# Patient Record
Sex: MALE | Race: WHITE | NOT HISPANIC OR LATINO | Employment: FULL TIME | URBAN - METROPOLITAN AREA
[De-identification: names, ages, dates, MRNs, and addresses within clinical notes are randomized per-mention and may not be internally consistent; named-entity substitution may affect disease eponyms.]

---

## 2020-10-14 PROCEDURE — 90686 IIV4 VACC NO PRSV 0.5 ML IM: CPT | Performed by: NURSE PRACTITIONER

## 2020-10-14 PROCEDURE — 90471 IMMUNIZATION ADMIN: CPT | Performed by: NURSE PRACTITIONER

## 2020-10-15 ENCOUNTER — DOCUMENTATION (OUTPATIENT)
Dept: BARIATRICS/WEIGHT MGMT | Facility: CLINIC | Age: 45
End: 2020-10-15

## 2020-10-15 ENCOUNTER — OFFICE VISIT (OUTPATIENT)
Dept: BARIATRICS/WEIGHT MGMT | Facility: CLINIC | Age: 45
End: 2020-10-15

## 2020-10-15 VITALS
SYSTOLIC BLOOD PRESSURE: 132 MMHG | HEIGHT: 70 IN | DIASTOLIC BLOOD PRESSURE: 78 MMHG | WEIGHT: 315 LBS | TEMPERATURE: 97.7 F | RESPIRATION RATE: 18 BRPM | HEART RATE: 94 BPM | OXYGEN SATURATION: 100 % | BODY MASS INDEX: 45.1 KG/M2

## 2020-10-15 DIAGNOSIS — R53.83 FATIGUE, UNSPECIFIED TYPE: ICD-10-CM

## 2020-10-15 DIAGNOSIS — E66.01 OBESITY, CLASS III, BMI 40-49.9 (MORBID OBESITY) (HCC): Primary | ICD-10-CM

## 2020-10-15 DIAGNOSIS — Z82.49 FHX: CARDIOVASCULAR DISEASE: ICD-10-CM

## 2020-10-15 DIAGNOSIS — K21.9 GASTROESOPHAGEAL REFLUX DISEASE, UNSPECIFIED WHETHER ESOPHAGITIS PRESENT: ICD-10-CM

## 2020-10-15 DIAGNOSIS — R10.13 DYSPEPSIA: ICD-10-CM

## 2020-10-15 DIAGNOSIS — Z72.0 TOBACCO USE: ICD-10-CM

## 2020-10-15 PROCEDURE — 99204 OFFICE O/P NEW MOD 45 MIN: CPT | Performed by: PHYSICIAN ASSISTANT

## 2020-10-15 RX ORDER — PHENTERMINE HYDROCHLORIDE 37.5 MG/1
37.5 TABLET ORAL DAILY
COMMUNITY
Start: 2020-07-29 | End: 2020-12-22

## 2020-10-15 RX ORDER — PHENTERMINE HYDROCHLORIDE 30 MG/1
37.5 CAPSULE ORAL EVERY MORNING
COMMUNITY
End: 2020-10-15

## 2020-10-15 RX ORDER — PANTOPRAZOLE SODIUM 40 MG/1
40 TABLET, DELAYED RELEASE ORAL
COMMUNITY

## 2020-10-15 NOTE — PROGRESS NOTES
[facility] BARIATRIC SURGERY  [address]  [address]  [facility] [address]  [phone]      Patient  Name:  Jefry Sierra  :  1975      Date of Visit: 10/15/2020      Chief Complaint:  weight gain; unable to maintain weight loss      History of Present Illness:  Jefry Sierra is a 45 y.o. male who presents today for evaluation, education and consultation regarding metabolic and bariatric surgery with Dr. Glasgow.     Jefry has been overweight for at least 35 years, has been 35 pounds or more overweight for at least 30 years, has been 100 pounds or more overweight for 25 or more years and started dieting at age 20.  Previous diet attempts include: Low Carbohydrate, Low Fat and Slim Fast; Adipex.  The most weight Jefry lost was 80 pounds but was unable to maintain that weight loss.  His maximum lifetime weight is 348 pounds.       Complete history has been obtained and discussed today, as pertinent to metabolic/ bariatric surgery.     Past Medical History:   Diagnosis Date   • Anesthesia complication     wakes w/ terrible headache   • Dyspepsia    • Dyspnea on exertion    • Fatigue    • GERD (gastroesophageal reflux disease)     controlled on Protonix, improved since stopping tobacco, denies prior eval   • IBS (irritable bowel syndrome)     worse w/ dairy or chocolates, denies prior GB eval    • Joint pain     prn Ibuprofen, avoids steroids   • Morbid obesity (CMS/HCC)    • RLS (restless legs syndrome)    • Sleep apnea     suspected, never tested   • Tobacco use     SNUFF, quit 22     Past Surgical History:   Procedure Laterality Date   • WISDOM TOOTH EXTRACTION     • WRIST SURGERY Right     fx repair       Allergies   Allergen Reactions   • Penicillins Rash     reaction as a child - uncertain if can tolerate Keflex       Current Outpatient Medications:   •  pantoprazole (PROTONIX) 40 MG EC tablet, Take 40 mg by mouth Daily., Disp: , Rfl:   •  phentermine (ADIPEX-P) 37.5 MG  tablet, Take 37.5 mg by mouth Daily., Disp: , Rfl:     Social History     Socioeconomic History   • Marital status:      Spouse name: Not on file   • Number of children: Not on file   • Years of education: Not on file   • Highest education level: Not on file   Tobacco Use   • Smoking status: Never Smoker   • Smokeless tobacco: Former User     Types: Snuff   Substance and Sexual Activity   • Alcohol use: Never     Frequency: Never   • Drug use: Never   Social History Narrative    .  Lives in [place].   w/ [employer].      Family History   Problem Relation Age of Onset   • Hypertension Mother    • Heart disease Mother    • Sleep apnea Mother    • Obesity Brother        Review of Systems:  Constitutional:  reports fatigue, weight gain and denies fevers, chills.  HEENT:  denies headache, ear pain or loss of hearing, blurred or double vision, nasal discharge or sore throat.  Cardiovascular:   denies hx heart disease, hx MI, chest pain, palpitations, edema, hx DVT.  Respiratory:   denies cough , wheezing, asthma, hx PE.  Gastrointestinal:  reports heartburn, IBS and denies dysphagia, nausea, vomiting, abdominal pain, gallbladder issues, liver disease.  Genitourinary:   denies history of  frequent UTI, incontinence, hematuria, dysuria, polyuria, polydipsia, renal insufficiency.    Musculoskeletal:  reports joint pain, back pain and denies fibromyalgia, arthritis and autoimmune disease.  Neurological:   denies migraines, numbness /tingling, dizziness, confusion, seizure.  Psychiatric:   denies depressed mood, hx depression, feeling anxious, hx anxiety, bipolar disorder.  Endocrine:   denies glucose intolerance, diabetes, thyroid disease, gout.  Hematologic:  denies bruising, bleeding disorder, hx anemia, hx blood transfusion.  Skin:   denies rashes, hx MRSA.    Physical Exam:  Vital Signs:  Weight: (!) 147 kg (324 lb 8 oz)   Body mass index is 46.56 kg/m².  Temp: 97.7 °F (36.5 °C)   Heart Rate: 94    BP: 132/78     Physical Exam  Vitals signs reviewed.   Constitutional:       Appearance: He is well-developed.      Comments: wearing a mask   HENT:      Head: Normocephalic and atraumatic.   Eyes:      General: No scleral icterus.     Conjunctiva/sclera: Conjunctivae normal.   Neck:      Musculoskeletal: Neck supple.      Thyroid: No thyromegaly.   Cardiovascular:      Rate and Rhythm: Normal rate and regular rhythm.      Heart sounds: No murmur.   Pulmonary:      Effort: Pulmonary effort is normal. No respiratory distress.      Breath sounds: Normal breath sounds. No wheezing or rales.   Abdominal:      General: Bowel sounds are normal. There is no distension.      Palpations: Abdomen is soft. There is no mass.      Tenderness: There is no abdominal tenderness.      Hernia: No hernia is present.      Comments: no surgical scars   Musculoskeletal: Normal range of motion.   Skin:     General: Skin is warm and dry.      Findings: No rash.   Neurological:      Mental Status: He is alert and oriented to person, place, and time.      Gait: Gait normal.   Psychiatric:         Judgment: Judgment normal.         Patient Active Problem List   Diagnosis   • Fatigue   • Dyspepsia   • Dyspnea on exertion   • Morbid obesity (CMS/HCC)   • Sleep apnea   • GERD (gastroesophageal reflux disease)   • IBS (irritable bowel syndrome)   • Joint pain   • RLS (restless legs syndrome)   • Tobacco use   • Anesthesia complication       Assessment:  45 y.o. male with medically complicated obesity pursuing sleeve gastrectomy.    Patient's Body mass index is 46.56 kg/m². BMI is above normal parameters. Recommendations include: MBS.  Metabolic & Bariatric Surgery is deemed medically necessary given the following obesity related comorbidities: possible sleep apnea and GERD .    Plan:  Patient understands that bariatric surgery is not cosmetic surgery but rather a tool to help make a lifelong commitment to lifestyle changes including diet,  exercise, behavior modifications, and healthy habits.  The patient has been educated today on those expected postoperative lifestyle changes.  Psychological and Nutritional consultations will be arranged prior to surgery.  Instructions on how to access [employer] (an internet based site w/ educational surgical videos) were given to the patient.  Recommended vitamin supplementation was reviewed.  The importance of avoiding ASA/ NSAIDS/ steroids/ tobacco/ hormones/ immunomodulators perioperatively was discussed in detail.  All questions/concerns have been addressed.      Further evaluation will include: CBC, CMP, Lipids, TSH, HgA1C, H.Pylori Stool, EKG, CXR, EGD and Nicotine Testing.    Additional clearances needed prior to surgery will include: Cardiology.       Further input to follow pending the above.           LOY Marsh

## 2020-10-15 NOTE — PROGRESS NOTES
"Metabolic and Bariatric Surgery  Presurgical Nutrition Assessment     Jefry Sierra  10/15/2020  [id number]  [id number]  1975  male    Surgery desired: Sleeve Gastrectomy     Ht 177.8 cm (70\")   Wt 147 kg (324 lb 8 oz)   Past Medical History:   Diagnosis Date   • Anesthesia complication     wakes w/ terrible headache   • Dyspepsia    • Dyspnea on exertion    • Fatigue    • GERD (gastroesophageal reflux disease)     controlled on Protonix, improved since stopping tobacco, denies prior eval   • IBS (irritable bowel syndrome)     worse w/ dairy or chocolates, denies prior GB eval    • Joint pain     prn Ibuprofen, avoids steroids   • Morbid obesity (CMS/HCC)    • RLS (restless legs syndrome)    • Sleep apnea     suspected, never tested   • Tobacco use     SNUFF, quit 9/4/20     Past Surgical History:   Procedure Laterality Date   • WISDOM TOOTH EXTRACTION  2000   • WRIST SURGERY Right 2000    fx repair     Allergies   Allergen Reactions   • Penicillins Rash     reaction as a child - uncertain if can tolerate Keflex       Current Outpatient Medications:   •  pantoprazole (PROTONIX) 40 MG EC tablet, Take 40 mg by mouth Daily., Disp: , Rfl:   •  phentermine (ADIPEX-P) 37.5 MG tablet, Take 37.5 mg by mouth Daily., Disp: , Rfl:       Nutrition Assessment    Estimated energy needs: 2600    Estimated calories for weight loss: 1800    IBW (Pounds): 224        Excess body weight (Pounds): 100       Nutrition Recall  24 Hour recall: (B) (L) (D) -  Reviewed and discussed with patient  coffee  1pm:  [employer] sandwich and curly fries, coke  6pm:  16 ounce ribeye, baked potato, water  8pm:  Brownie  Water       Exercise  rarely      Education    Provided manual for Sleeve Gastrectomy and reviewed necessary vitamin and protein supplementation for surgery.     Provided follow-up options for support, including contact information for dietitians here, if desired.      Recommend that team follow per protocol.      Nutrition Goals "   Dietary Guidelines per manual  Protein goal:  grams per day   Eliminate soda    Exercise Goals  Add 15-30 minutes of activity per day as tolerated        Kaur Davis, SAMEER  10/15/2020  14:05 EDT

## 2020-10-16 LAB
ALBUMIN SERPL-MCNC: 4.4 G/DL (ref 4–5)
ALBUMIN/GLOB SERPL: 1.5 {RATIO} (ref 1.2–2.2)
ALP SERPL-CCNC: 78 IU/L (ref 39–117)
ALT SERPL-CCNC: 26 IU/L (ref 0–44)
AST SERPL-CCNC: 23 IU/L (ref 0–40)
BASOPHILS # BLD AUTO: 0.1 X10E3/UL (ref 0–0.2)
BASOPHILS NFR BLD AUTO: 1 %
BILIRUB SERPL-MCNC: 0.4 MG/DL (ref 0–1.2)
BUN SERPL-MCNC: 14 MG/DL (ref 6–24)
BUN/CREAT SERPL: 16 (ref 9–20)
CALCIUM SERPL-MCNC: 9.3 MG/DL (ref 8.7–10.2)
CHLORIDE SERPL-SCNC: 104 MMOL/L (ref 96–106)
CHOLEST SERPL-MCNC: 178 MG/DL (ref 100–199)
CO2 SERPL-SCNC: 24 MMOL/L (ref 20–29)
CREAT SERPL-MCNC: 0.87 MG/DL (ref 0.76–1.27)
EOSINOPHIL # BLD AUTO: 0.2 X10E3/UL (ref 0–0.4)
EOSINOPHIL NFR BLD AUTO: 2 %
ERYTHROCYTE [DISTWIDTH] IN BLOOD BY AUTOMATED COUNT: 13.2 % (ref 11.6–15.4)
GLOBULIN SER CALC-MCNC: 2.9 G/DL (ref 1.5–4.5)
GLUCOSE SERPL-MCNC: 92 MG/DL (ref 65–99)
HBA1C MFR BLD: 5.7 % (ref 4.8–5.6)
HCT VFR BLD AUTO: 43.2 % (ref 37.5–51)
HDLC SERPL-MCNC: 33 MG/DL
HGB BLD-MCNC: 14.4 G/DL (ref 13–17.7)
IMM GRANULOCYTES # BLD AUTO: 0 X10E3/UL (ref 0–0.1)
IMM GRANULOCYTES NFR BLD AUTO: 0 %
LDLC SERPL CALC-MCNC: 119 MG/DL (ref 0–99)
LYMPHOCYTES # BLD AUTO: 2.4 X10E3/UL (ref 0.7–3.1)
LYMPHOCYTES NFR BLD AUTO: 24 %
MCH RBC QN AUTO: 27.7 PG (ref 26.6–33)
MCHC RBC AUTO-ENTMCNC: 33.3 G/DL (ref 31.5–35.7)
MCV RBC AUTO: 83 FL (ref 79–97)
MONOCYTES # BLD AUTO: 0.7 X10E3/UL (ref 0.1–0.9)
MONOCYTES NFR BLD AUTO: 7 %
NEUTROPHILS # BLD AUTO: 6.4 X10E3/UL (ref 1.4–7)
NEUTROPHILS NFR BLD AUTO: 66 %
PLATELET # BLD AUTO: 330 X10E3/UL (ref 150–450)
POTASSIUM SERPL-SCNC: 4.5 MMOL/L (ref 3.5–5.2)
PROT SERPL-MCNC: 7.3 G/DL (ref 6–8.5)
RBC # BLD AUTO: 5.2 X10E6/UL (ref 4.14–5.8)
SODIUM SERPL-SCNC: 141 MMOL/L (ref 134–144)
TRIGL SERPL-MCNC: 146 MG/DL (ref 0–149)
TSH SERPL DL<=0.005 MIU/L-ACNC: 3.02 UIU/ML (ref 0.45–4.5)
VLDLC SERPL CALC-MCNC: 26 MG/DL (ref 5–40)
WBC # BLD AUTO: 9.8 X10E3/UL (ref 3.4–10.8)

## 2020-10-23 ENCOUNTER — APPOINTMENT (OUTPATIENT)
Dept: PREADMISSION TESTING | Facility: HOSPITAL | Age: 45
End: 2020-10-23

## 2020-10-23 PROCEDURE — C9803 HOPD COVID-19 SPEC COLLECT: HCPCS

## 2020-10-23 PROCEDURE — U0004 COV-19 TEST NON-CDC HGH THRU: HCPCS

## 2020-10-24 LAB — SARS-COV-2 RNA RESP QL NAA+PROBE: NOT DETECTED

## 2020-10-26 ENCOUNTER — OUTSIDE FACILITY SERVICE (OUTPATIENT)
Dept: BARIATRICS/WEIGHT MGMT | Facility: CLINIC | Age: 45
End: 2020-10-26

## 2020-10-26 ENCOUNTER — LAB REQUISITION (OUTPATIENT)
Dept: LAB | Facility: HOSPITAL | Age: 45
End: 2020-10-26

## 2020-10-26 DIAGNOSIS — K21.00 GASTRO-ESOPHAGEAL REFLUX DISEASE WITH ESOPHAGITIS, WITHOUT BLEEDING: ICD-10-CM

## 2020-10-26 PROCEDURE — 43239 EGD BIOPSY SINGLE/MULTIPLE: CPT | Performed by: SURGERY

## 2020-10-26 PROCEDURE — 88305 TISSUE EXAM BY PATHOLOGIST: CPT | Performed by: SURGERY

## 2020-10-26 PROCEDURE — 88342 IMHCHEM/IMCYTCHM 1ST ANTB: CPT | Performed by: SURGERY

## 2020-10-30 ENCOUNTER — LAB (OUTPATIENT)
Dept: LAB | Facility: HOSPITAL | Age: 45
End: 2020-10-30

## 2020-10-30 DIAGNOSIS — R10.13 DYSPEPSIA: ICD-10-CM

## 2020-10-30 PROCEDURE — 83013 H PYLORI (C-13) BREATH: CPT

## 2020-11-01 LAB
CYTO UR: NORMAL
LAB AP CASE REPORT: NORMAL
LAB AP CLINICAL INFORMATION: NORMAL
PATH REPORT.FINAL DX SPEC: NORMAL
PATH REPORT.GROSS SPEC: NORMAL

## 2020-11-02 LAB — UREA BREATH TEST QL: POSITIVE

## 2020-12-01 RX ORDER — CLARITHROMYCIN 500 MG/1
500 TABLET, COATED ORAL 2 TIMES DAILY
Qty: 28 TABLET | Refills: 0 | Status: CANCELLED | OUTPATIENT
Start: 2020-12-01 | End: 2020-12-15

## 2020-12-01 RX ORDER — METRONIDAZOLE 500 MG/1
500 TABLET ORAL 3 TIMES DAILY
Qty: 42 TABLET | Refills: 0 | Status: CANCELLED | OUTPATIENT
Start: 2020-12-01 | End: 2020-12-15

## 2020-12-01 RX ORDER — OMEPRAZOLE 20 MG/1
20 CAPSULE, DELAYED RELEASE ORAL 2 TIMES DAILY
Qty: 28 CAPSULE | Refills: 0 | Status: CANCELLED | OUTPATIENT
Start: 2020-12-01 | End: 2020-12-15

## 2020-12-02 DIAGNOSIS — Z87.891 HX OF TOBACCO USE, PRESENTING HAZARDS TO HEALTH: Primary | ICD-10-CM

## 2020-12-04 ENCOUNTER — LAB (OUTPATIENT)
Dept: LAB | Facility: HOSPITAL | Age: 45
End: 2020-12-04

## 2020-12-04 DIAGNOSIS — Z87.891 HX OF TOBACCO USE, PRESENTING HAZARDS TO HEALTH: ICD-10-CM

## 2020-12-04 PROCEDURE — G0480 DRUG TEST DEF 1-7 CLASSES: HCPCS

## 2020-12-04 PROCEDURE — 36415 COLL VENOUS BLD VENIPUNCTURE: CPT

## 2020-12-11 LAB
COTININE SERPL-MCNC: <1 NG/ML
NICOTINE SERPL-MCNC: <1 NG/ML

## 2020-12-15 ENCOUNTER — PREP FOR SURGERY (OUTPATIENT)
Dept: OTHER | Facility: HOSPITAL | Age: 45
End: 2020-12-15

## 2020-12-15 DIAGNOSIS — E66.01 MORBID OBESITY WITH BODY MASS INDEX (BMI) OF 45.0 TO 49.9 IN ADULT (HCC): Primary | ICD-10-CM

## 2020-12-15 DIAGNOSIS — K21.9 HIATAL HERNIA WITH GASTROESOPHAGEAL REFLUX: ICD-10-CM

## 2020-12-15 DIAGNOSIS — K44.9 HIATAL HERNIA WITH GASTROESOPHAGEAL REFLUX: ICD-10-CM

## 2020-12-15 DIAGNOSIS — R06.00 DYSPNEA, UNSPECIFIED TYPE: Primary | ICD-10-CM

## 2020-12-15 DIAGNOSIS — R53.83 FATIGUE, UNSPECIFIED TYPE: ICD-10-CM

## 2020-12-15 RX ORDER — GABAPENTIN 300 MG/1
600 CAPSULE ORAL ONCE
Status: CANCELLED | OUTPATIENT
Start: 2020-12-15 | End: 2020-12-15

## 2020-12-15 RX ORDER — PANTOPRAZOLE SODIUM 40 MG/10ML
40 INJECTION, POWDER, LYOPHILIZED, FOR SOLUTION INTRAVENOUS ONCE
Status: CANCELLED | OUTPATIENT
Start: 2020-12-15 | End: 2020-12-15

## 2020-12-15 RX ORDER — CHLORHEXIDINE GLUCONATE 0.12 MG/ML
30 RINSE ORAL
Status: CANCELLED | OUTPATIENT
Start: 2020-12-15 | End: 2020-12-15

## 2020-12-15 RX ORDER — SODIUM CHLORIDE, SODIUM LACTATE, POTASSIUM CHLORIDE, CALCIUM CHLORIDE 600; 310; 30; 20 MG/100ML; MG/100ML; MG/100ML; MG/100ML
150 INJECTION, SOLUTION INTRAVENOUS CONTINUOUS
Status: CANCELLED | OUTPATIENT
Start: 2020-12-15

## 2020-12-15 RX ORDER — LEVOFLOXACIN 5 MG/ML
500 INJECTION, SOLUTION INTRAVENOUS EVERY 24 HOURS
Status: CANCELLED | OUTPATIENT
Start: 2020-12-15 | End: 2020-12-16

## 2020-12-15 RX ORDER — SCOLOPAMINE TRANSDERMAL SYSTEM 1 MG/1
1 PATCH, EXTENDED RELEASE TRANSDERMAL ONCE
Status: CANCELLED | OUTPATIENT
Start: 2020-12-15 | End: 2020-12-15

## 2020-12-15 RX ORDER — SODIUM CHLORIDE 0.9 % (FLUSH) 0.9 %
3-10 SYRINGE (ML) INJECTION AS NEEDED
Status: CANCELLED | OUTPATIENT
Start: 2020-12-15

## 2020-12-15 RX ORDER — SODIUM CHLORIDE 0.9 % (FLUSH) 0.9 %
3 SYRINGE (ML) INJECTION EVERY 12 HOURS SCHEDULED
Status: CANCELLED | OUTPATIENT
Start: 2020-12-15

## 2020-12-15 RX ORDER — ACETAMINOPHEN 500 MG
1000 TABLET ORAL ONCE
Status: CANCELLED | OUTPATIENT
Start: 2020-12-15 | End: 2020-12-15

## 2020-12-16 ENCOUNTER — LAB (OUTPATIENT)
Dept: LAB | Facility: HOSPITAL | Age: 45
End: 2020-12-16

## 2020-12-16 ENCOUNTER — HOSPITAL ENCOUNTER (OUTPATIENT)
Dept: GENERAL RADIOLOGY | Facility: HOSPITAL | Age: 45
Discharge: HOME | End: 2020-12-16

## 2020-12-16 DIAGNOSIS — R53.83 FATIGUE, UNSPECIFIED TYPE: ICD-10-CM

## 2020-12-16 DIAGNOSIS — R06.00 DYSPNEA, UNSPECIFIED TYPE: ICD-10-CM

## 2020-12-16 LAB
ALBUMIN SERPL-MCNC: 4.3 G/DL (ref 3.5–5.2)
ALBUMIN/GLOB SERPL: 1.3 G/DL
ALP SERPL-CCNC: 53 U/L (ref 39–117)
ALT SERPL W P-5'-P-CCNC: 37 U/L (ref 1–41)
ANION GAP SERPL CALCULATED.3IONS-SCNC: 10.6 MMOL/L (ref 5–15)
AST SERPL-CCNC: 28 U/L (ref 1–40)
BILIRUB SERPL-MCNC: 0.4 MG/DL (ref 0–1.2)
BUN SERPL-MCNC: 19 MG/DL (ref 6–20)
BUN/CREAT SERPL: 25 (ref 7–25)
CALCIUM SPEC-SCNC: 9.4 MG/DL (ref 8.6–10.5)
CHLORIDE SERPL-SCNC: 102 MMOL/L (ref 98–107)
CO2 SERPL-SCNC: 24.4 MMOL/L (ref 22–29)
CREAT SERPL-MCNC: 0.76 MG/DL (ref 0.76–1.27)
DEPRECATED RDW RBC AUTO: 38 FL (ref 37–54)
ERYTHROCYTE [DISTWIDTH] IN BLOOD BY AUTOMATED COUNT: 13.6 % (ref 12.3–15.4)
GFR SERPL CREATININE-BSD FRML MDRD: 112 ML/MIN/1.73
GLOBULIN UR ELPH-MCNC: 3.4 GM/DL
GLUCOSE SERPL-MCNC: 94 MG/DL (ref 65–99)
HCT VFR BLD AUTO: 42.1 % (ref 37.5–51)
HGB BLD-MCNC: 14.3 G/DL (ref 13–17.7)
MCH RBC QN AUTO: 27.1 PG (ref 26.6–33)
MCHC RBC AUTO-ENTMCNC: 34 G/DL (ref 31.5–35.7)
MCV RBC AUTO: 79.9 FL (ref 79–97)
PLATELET # BLD AUTO: 337 10*3/MM3 (ref 140–450)
PMV BLD AUTO: 10.7 FL (ref 6–12)
POTASSIUM SERPL-SCNC: 4 MMOL/L (ref 3.5–5.2)
PROT SERPL-MCNC: 7.7 G/DL (ref 6–8.5)
RBC # BLD AUTO: 5.27 10*6/MM3 (ref 4.14–5.8)
SODIUM SERPL-SCNC: 137 MMOL/L (ref 136–145)
WBC # BLD AUTO: 7.39 10*3/MM3 (ref 3.4–10.8)

## 2020-12-16 PROCEDURE — 71046 X-RAY EXAM CHEST 2 VIEWS: CPT

## 2020-12-16 PROCEDURE — 85027 COMPLETE CBC AUTOMATED: CPT

## 2020-12-16 PROCEDURE — 80053 COMPREHEN METABOLIC PANEL: CPT

## 2020-12-16 PROCEDURE — 71046 X-RAY EXAM CHEST 2 VIEWS: CPT | Performed by: RADIOLOGY

## 2020-12-16 PROCEDURE — 36415 COLL VENOUS BLD VENIPUNCTURE: CPT

## 2020-12-17 PROBLEM — K44.9 HIATAL HERNIA WITH GASTROESOPHAGEAL REFLUX: Status: ACTIVE | Noted: 2020-12-17

## 2020-12-17 PROBLEM — K21.9 HIATAL HERNIA WITH GASTROESOPHAGEAL REFLUX: Status: ACTIVE | Noted: 2020-12-17

## 2020-12-22 ENCOUNTER — CONSULT (OUTPATIENT)
Dept: BARIATRICS/WEIGHT MGMT | Facility: CLINIC | Age: 45
End: 2020-12-22

## 2020-12-22 VITALS
OXYGEN SATURATION: 98 % | HEART RATE: 102 BPM | SYSTOLIC BLOOD PRESSURE: 134 MMHG | DIASTOLIC BLOOD PRESSURE: 76 MMHG | RESPIRATION RATE: 18 BRPM | HEIGHT: 70 IN | WEIGHT: 315 LBS | TEMPERATURE: 98.2 F | BODY MASS INDEX: 45.1 KG/M2

## 2020-12-22 DIAGNOSIS — K21.9 GASTROESOPHAGEAL REFLUX DISEASE WITHOUT ESOPHAGITIS: ICD-10-CM

## 2020-12-22 DIAGNOSIS — A04.8 H. PYLORI INFECTION: Primary | ICD-10-CM

## 2020-12-22 DIAGNOSIS — G47.33 OBSTRUCTIVE SLEEP APNEA SYNDROME: ICD-10-CM

## 2020-12-22 DIAGNOSIS — K58.9 IRRITABLE BOWEL SYNDROME, UNSPECIFIED TYPE: ICD-10-CM

## 2020-12-22 PROCEDURE — 99214 OFFICE O/P EST MOD 30 MIN: CPT | Performed by: SURGERY

## 2020-12-22 RX ORDER — LISINOPRIL AND HYDROCHLOROTHIAZIDE 12.5; 1 MG/1; MG/1
1 TABLET ORAL DAILY
COMMUNITY
Start: 2020-12-08 | End: 2020-12-30 | Stop reason: HOSPADM

## 2020-12-27 ENCOUNTER — APPOINTMENT (OUTPATIENT)
Dept: PREADMISSION TESTING | Facility: HOSPITAL | Age: 45
End: 2020-12-27

## 2020-12-27 ENCOUNTER — ANESTHESIA EVENT (OUTPATIENT)
Dept: PERIOP | Facility: HOSPITAL | Age: 45
End: 2020-12-27

## 2020-12-27 DIAGNOSIS — K44.9 HIATAL HERNIA WITH GASTROESOPHAGEAL REFLUX: ICD-10-CM

## 2020-12-27 DIAGNOSIS — E66.01 MORBID OBESITY WITH BODY MASS INDEX (BMI) OF 45.0 TO 49.9 IN ADULT (HCC): ICD-10-CM

## 2020-12-27 DIAGNOSIS — K21.9 HIATAL HERNIA WITH GASTROESOPHAGEAL REFLUX: ICD-10-CM

## 2020-12-27 LAB
ABO GROUP BLD: NORMAL
BLD GP AB SCN SERPL QL: NEGATIVE
DEPRECATED RDW RBC AUTO: 39.8 FL (ref 37–54)
ERYTHROCYTE [DISTWIDTH] IN BLOOD BY AUTOMATED COUNT: 13.6 % (ref 12.3–15.4)
FLUAV RNA RESP QL NAA+PROBE: NOT DETECTED
FLUBV RNA RESP QL NAA+PROBE: NOT DETECTED
HBA1C MFR BLD: 5.7 % (ref 4.8–5.6)
HCT VFR BLD AUTO: 43.7 % (ref 37.5–51)
HGB BLD-MCNC: 14 G/DL (ref 13–17.7)
MCH RBC QN AUTO: 26 PG (ref 26.6–33)
MCHC RBC AUTO-ENTMCNC: 32 G/DL (ref 31.5–35.7)
MCV RBC AUTO: 81.2 FL (ref 79–97)
PLATELET # BLD AUTO: 348 10*3/MM3 (ref 140–450)
PMV BLD AUTO: 10.3 FL (ref 6–12)
POTASSIUM SERPL-SCNC: 4.6 MMOL/L (ref 3.5–5.2)
RBC # BLD AUTO: 5.38 10*6/MM3 (ref 4.14–5.8)
RH BLD: POSITIVE
SARS-COV-2 RNA RESP QL NAA+PROBE: NOT DETECTED
T&S EXPIRATION DATE: NORMAL
WBC # BLD AUTO: 8.27 10*3/MM3 (ref 3.4–10.8)

## 2020-12-27 PROCEDURE — 85027 COMPLETE CBC AUTOMATED: CPT

## 2020-12-27 PROCEDURE — 86900 BLOOD TYPING SEROLOGIC ABO: CPT

## 2020-12-27 PROCEDURE — 83036 HEMOGLOBIN GLYCOSYLATED A1C: CPT

## 2020-12-27 PROCEDURE — 87636 SARSCOV2 & INF A&B AMP PRB: CPT

## 2020-12-27 PROCEDURE — 84132 ASSAY OF SERUM POTASSIUM: CPT

## 2020-12-27 PROCEDURE — 36415 COLL VENOUS BLD VENIPUNCTURE: CPT

## 2020-12-27 PROCEDURE — 86850 RBC ANTIBODY SCREEN: CPT

## 2020-12-27 PROCEDURE — C9803 HOPD COVID-19 SPEC COLLECT: HCPCS

## 2020-12-27 PROCEDURE — 86901 BLOOD TYPING SEROLOGIC RH(D): CPT

## 2020-12-27 RX ORDER — FAMOTIDINE 10 MG/ML
20 INJECTION, SOLUTION INTRAVENOUS ONCE
Status: CANCELLED | OUTPATIENT
Start: 2020-12-27 | End: 2020-12-27

## 2020-12-27 RX ORDER — SODIUM CHLORIDE, SODIUM LACTATE, POTASSIUM CHLORIDE, CALCIUM CHLORIDE 600; 310; 30; 20 MG/100ML; MG/100ML; MG/100ML; MG/100ML
9 INJECTION, SOLUTION INTRAVENOUS CONTINUOUS
Status: CANCELLED | OUTPATIENT
Start: 2020-12-27

## 2020-12-27 RX ORDER — FAMOTIDINE 20 MG/1
20 TABLET, FILM COATED ORAL ONCE
Status: CANCELLED | OUTPATIENT
Start: 2020-12-27 | End: 2020-12-27

## 2020-12-28 ENCOUNTER — ANESTHESIA (OUTPATIENT)
Dept: PERIOP | Facility: HOSPITAL | Age: 45
End: 2020-12-28

## 2020-12-28 ENCOUNTER — HOSPITAL ENCOUNTER (INPATIENT)
Facility: HOSPITAL | Age: 45
LOS: 1 days | Discharge: HOME | End: 2020-12-29
Attending: SURGERY | Admitting: SURGERY

## 2020-12-28 DIAGNOSIS — K21.9 HIATAL HERNIA WITH GASTROESOPHAGEAL REFLUX: ICD-10-CM

## 2020-12-28 DIAGNOSIS — K44.9 HIATAL HERNIA WITH GASTROESOPHAGEAL REFLUX: ICD-10-CM

## 2020-12-28 DIAGNOSIS — E66.01 MORBID OBESITY WITH BODY MASS INDEX (BMI) OF 45.0 TO 49.9 IN ADULT (HCC): ICD-10-CM

## 2020-12-28 LAB
ABO GROUP BLD: NORMAL
RH BLD: POSITIVE

## 2020-12-28 PROCEDURE — 86900 BLOOD TYPING SEROLOGIC ABO: CPT

## 2020-12-28 PROCEDURE — 25010000002 FENTANYL CITRATE (PF) 100 MCG/2ML SOLUTION: Performed by: ANESTHESIOLOGY

## 2020-12-28 PROCEDURE — 88307 TISSUE EXAM BY PATHOLOGIST: CPT | Performed by: SURGERY

## 2020-12-28 PROCEDURE — 86901 BLOOD TYPING SEROLOGIC RH(D): CPT

## 2020-12-28 PROCEDURE — 25010000002 PROPOFOL 10 MG/ML EMULSION: Performed by: ANESTHESIOLOGY

## 2020-12-28 PROCEDURE — 25010000002 LEVOFLOXACIN PER 250 MG: Performed by: SURGERY

## 2020-12-28 PROCEDURE — 25010000002 DEXAMETHASONE SODIUM PHOSPHATE 10 MG/ML SOLUTION: Performed by: ANESTHESIOLOGY

## 2020-12-28 PROCEDURE — 25010000002 ENOXAPARIN PER 10 MG: Performed by: SURGERY

## 2020-12-28 PROCEDURE — 0DB64Z3 EXCISION OF STOMACH, PERCUTANEOUS ENDOSCOPIC APPROACH, VERTICAL: ICD-10-PCS | Performed by: SURGERY

## 2020-12-28 PROCEDURE — 25010000002 ONDANSETRON PER 1 MG: Performed by: SURGERY

## 2020-12-28 PROCEDURE — 25010000002 ONDANSETRON PER 1 MG: Performed by: ANESTHESIOLOGY

## 2020-12-28 PROCEDURE — 0DJ08ZZ INSPECTION OF UPPER INTESTINAL TRACT, VIA NATURAL OR ARTIFICIAL OPENING ENDOSCOPIC: ICD-10-PCS | Performed by: SURGERY

## 2020-12-28 PROCEDURE — 43775 LAP SLEEVE GASTRECTOMY: CPT | Performed by: SURGERY

## 2020-12-28 PROCEDURE — 25010000003 LIDOCAINE 1 % SOLUTION: Performed by: ANESTHESIOLOGY

## 2020-12-28 DEVICE — BLACK REINFORCED INTELLIGENT RELOAD, FOR USE WITH SIGNIA STAPLING SYSTEM
Type: IMPLANTABLE DEVICE | Site: ABDOMEN | Status: FUNCTIONAL
Brand: TRI-STAPLE 2.0

## 2020-12-28 DEVICE — REINFORCED INTELLIGENT RELOAD, FOR USE WITH SIGNIA STAPLING SYSTEM
Type: IMPLANTABLE DEVICE | Site: ABDOMEN | Status: FUNCTIONAL
Brand: TRI-STAPLE 2.0

## 2020-12-28 RX ORDER — ONDANSETRON 4 MG/1
4 TABLET, FILM COATED ORAL EVERY 6 HOURS PRN
Status: DISCONTINUED | OUTPATIENT
Start: 2020-12-29 | End: 2020-12-30 | Stop reason: HOSPADM

## 2020-12-28 RX ORDER — HYDROMORPHONE HYDROCHLORIDE 1 MG/ML
0.5 INJECTION, SOLUTION INTRAMUSCULAR; INTRAVENOUS; SUBCUTANEOUS
Status: DISCONTINUED | OUTPATIENT
Start: 2020-12-28 | End: 2020-12-28 | Stop reason: HOSPADM

## 2020-12-28 RX ORDER — FENTANYL CITRATE 50 UG/ML
50 INJECTION, SOLUTION INTRAMUSCULAR; INTRAVENOUS
Status: DISCONTINUED | OUTPATIENT
Start: 2020-12-28 | End: 2020-12-28 | Stop reason: HOSPADM

## 2020-12-28 RX ORDER — ALBUTEROL SULFATE 2.5 MG/3ML
2.5 SOLUTION RESPIRATORY (INHALATION) EVERY 4 HOURS PRN
Status: DISCONTINUED | OUTPATIENT
Start: 2020-12-28 | End: 2020-12-30 | Stop reason: HOSPADM

## 2020-12-28 RX ORDER — ONDANSETRON 2 MG/ML
4 INJECTION INTRAMUSCULAR; INTRAVENOUS EVERY 6 HOURS
Status: DISPENSED | OUTPATIENT
Start: 2020-12-28 | End: 2020-12-29

## 2020-12-28 RX ORDER — LEVOFLOXACIN 5 MG/ML
500 INJECTION, SOLUTION INTRAVENOUS EVERY 24 HOURS
Status: COMPLETED | OUTPATIENT
Start: 2020-12-29 | End: 2020-12-29

## 2020-12-28 RX ORDER — CHLORHEXIDINE GLUCONATE 0.12 MG/ML
30 RINSE ORAL
Status: COMPLETED | OUTPATIENT
Start: 2020-12-28 | End: 2020-12-28

## 2020-12-28 RX ORDER — ACETAMINOPHEN 500 MG
1000 TABLET ORAL EVERY 8 HOURS SCHEDULED
Status: DISCONTINUED | OUTPATIENT
Start: 2020-12-28 | End: 2020-12-30 | Stop reason: HOSPADM

## 2020-12-28 RX ORDER — LIDOCAINE HYDROCHLORIDE 10 MG/ML
0.5 INJECTION, SOLUTION EPIDURAL; INFILTRATION; INTRACAUDAL; PERINEURAL ONCE AS NEEDED
Status: COMPLETED | OUTPATIENT
Start: 2020-12-28 | End: 2020-12-28

## 2020-12-28 RX ORDER — SODIUM CHLORIDE AND POTASSIUM CHLORIDE 150; 450 MG/100ML; MG/100ML
125 INJECTION, SOLUTION INTRAVENOUS CONTINUOUS
Status: DISCONTINUED | OUTPATIENT
Start: 2020-12-29 | End: 2020-12-30 | Stop reason: HOSPADM

## 2020-12-28 RX ORDER — SCOLOPAMINE TRANSDERMAL SYSTEM 1 MG/1
1 PATCH, EXTENDED RELEASE TRANSDERMAL ONCE
Status: DISCONTINUED | OUTPATIENT
Start: 2020-12-28 | End: 2020-12-28

## 2020-12-28 RX ORDER — PANTOPRAZOLE SODIUM 40 MG/10ML
40 INJECTION, POWDER, LYOPHILIZED, FOR SOLUTION INTRAVENOUS ONCE
Status: COMPLETED | OUTPATIENT
Start: 2020-12-28 | End: 2020-12-28

## 2020-12-28 RX ORDER — DEXAMETHASONE SODIUM PHOSPHATE 10 MG/ML
INJECTION, SOLUTION INTRAMUSCULAR; INTRAVENOUS AS NEEDED
Status: DISCONTINUED | OUTPATIENT
Start: 2020-12-28 | End: 2020-12-28 | Stop reason: SURG

## 2020-12-28 RX ORDER — PROCHLORPERAZINE MALEATE 10 MG
10 TABLET ORAL EVERY 6 HOURS PRN
Status: DISCONTINUED | OUTPATIENT
Start: 2020-12-28 | End: 2020-12-30 | Stop reason: HOSPADM

## 2020-12-28 RX ORDER — HYDROMORPHONE HYDROCHLORIDE 2 MG/1
2 TABLET ORAL EVERY 4 HOURS PRN
Status: DISCONTINUED | OUTPATIENT
Start: 2020-12-28 | End: 2020-12-30 | Stop reason: HOSPADM

## 2020-12-28 RX ORDER — SODIUM CHLORIDE 0.9 % (FLUSH) 0.9 %
10 SYRINGE (ML) INJECTION EVERY 12 HOURS SCHEDULED
Status: DISCONTINUED | OUTPATIENT
Start: 2020-12-28 | End: 2020-12-28 | Stop reason: HOSPADM

## 2020-12-28 RX ORDER — MAGNESIUM HYDROXIDE 1200 MG/15ML
LIQUID ORAL AS NEEDED
Status: DISCONTINUED | OUTPATIENT
Start: 2020-12-28 | End: 2020-12-28 | Stop reason: HOSPADM

## 2020-12-28 RX ORDER — PROMETHAZINE HYDROCHLORIDE 12.5 MG/1
12.5 TABLET ORAL EVERY 6 HOURS PRN
Status: DISCONTINUED | OUTPATIENT
Start: 2020-12-28 | End: 2020-12-30 | Stop reason: HOSPADM

## 2020-12-28 RX ORDER — LORAZEPAM 1 MG/1
1 TABLET ORAL EVERY 12 HOURS PRN
Status: DISCONTINUED | OUTPATIENT
Start: 2020-12-28 | End: 2020-12-30 | Stop reason: HOSPADM

## 2020-12-28 RX ORDER — SIMETHICONE 80 MG
80 TABLET,CHEWABLE ORAL 4 TIMES DAILY PRN
Status: DISCONTINUED | OUTPATIENT
Start: 2020-12-28 | End: 2020-12-30 | Stop reason: HOSPADM

## 2020-12-28 RX ORDER — NALOXONE HCL 0.4 MG/ML
0.1 VIAL (ML) INJECTION
Status: DISCONTINUED | OUTPATIENT
Start: 2020-12-28 | End: 2020-12-30 | Stop reason: HOSPADM

## 2020-12-28 RX ORDER — GABAPENTIN 100 MG/1
100 CAPSULE ORAL 3 TIMES DAILY
Status: DISCONTINUED | OUTPATIENT
Start: 2020-12-28 | End: 2020-12-30 | Stop reason: HOSPADM

## 2020-12-28 RX ORDER — MORPHINE SULFATE 4 MG/ML
4 INJECTION, SOLUTION INTRAMUSCULAR; INTRAVENOUS
Status: DISCONTINUED | OUTPATIENT
Start: 2020-12-28 | End: 2020-12-30 | Stop reason: HOSPADM

## 2020-12-28 RX ORDER — FENTANYL CITRATE 50 UG/ML
INJECTION, SOLUTION INTRAMUSCULAR; INTRAVENOUS AS NEEDED
Status: DISCONTINUED | OUTPATIENT
Start: 2020-12-28 | End: 2020-12-28 | Stop reason: SURG

## 2020-12-28 RX ORDER — LIDOCAINE HYDROCHLORIDE 10 MG/ML
INJECTION, SOLUTION INFILTRATION; PERINEURAL AS NEEDED
Status: DISCONTINUED | OUTPATIENT
Start: 2020-12-28 | End: 2020-12-28 | Stop reason: SURG

## 2020-12-28 RX ORDER — METOCLOPRAMIDE HYDROCHLORIDE 5 MG/ML
10 INJECTION INTRAMUSCULAR; INTRAVENOUS EVERY 6 HOURS PRN
Status: DISCONTINUED | OUTPATIENT
Start: 2020-12-28 | End: 2020-12-30 | Stop reason: HOSPADM

## 2020-12-28 RX ORDER — LORAZEPAM 2 MG/ML
0.5 INJECTION INTRAMUSCULAR EVERY 12 HOURS PRN
Status: DISCONTINUED | OUTPATIENT
Start: 2020-12-28 | End: 2020-12-30 | Stop reason: HOSPADM

## 2020-12-28 RX ORDER — LISINOPRIL 10 MG/1
10 TABLET ORAL
Status: DISCONTINUED | OUTPATIENT
Start: 2020-12-29 | End: 2020-12-30 | Stop reason: HOSPADM

## 2020-12-28 RX ORDER — GABAPENTIN 300 MG/1
600 CAPSULE ORAL ONCE
Status: COMPLETED | OUTPATIENT
Start: 2020-12-28 | End: 2020-12-28

## 2020-12-28 RX ORDER — CYANOCOBALAMIN 1000 UG/ML
1000 INJECTION, SOLUTION INTRAMUSCULAR; SUBCUTANEOUS ONCE
Status: COMPLETED | OUTPATIENT
Start: 2020-12-29 | End: 2020-12-29

## 2020-12-28 RX ORDER — ACETAMINOPHEN 500 MG
1000 TABLET ORAL ONCE
Status: COMPLETED | OUTPATIENT
Start: 2020-12-28 | End: 2020-12-28

## 2020-12-28 RX ORDER — PANTOPRAZOLE SODIUM 40 MG/10ML
40 INJECTION, POWDER, LYOPHILIZED, FOR SOLUTION INTRAVENOUS
Status: DISCONTINUED | OUTPATIENT
Start: 2020-12-29 | End: 2020-12-30 | Stop reason: HOSPADM

## 2020-12-28 RX ORDER — SODIUM CHLORIDE, SODIUM LACTATE, POTASSIUM CHLORIDE, CALCIUM CHLORIDE 600; 310; 30; 20 MG/100ML; MG/100ML; MG/100ML; MG/100ML
150 INJECTION, SOLUTION INTRAVENOUS CONTINUOUS
Status: DISCONTINUED | OUTPATIENT
Start: 2020-12-28 | End: 2020-12-28 | Stop reason: HOSPADM

## 2020-12-28 RX ORDER — SODIUM CHLORIDE, SODIUM LACTATE, POTASSIUM CHLORIDE, CALCIUM CHLORIDE 600; 310; 30; 20 MG/100ML; MG/100ML; MG/100ML; MG/100ML
150 INJECTION, SOLUTION INTRAVENOUS CONTINUOUS
Status: DISCONTINUED | OUTPATIENT
Start: 2020-12-28 | End: 2020-12-30 | Stop reason: HOSPADM

## 2020-12-28 RX ORDER — OXYCODONE HYDROCHLORIDE 5 MG/1
5 TABLET ORAL EVERY 6 HOURS PRN
Status: DISCONTINUED | OUTPATIENT
Start: 2020-12-28 | End: 2020-12-30 | Stop reason: HOSPADM

## 2020-12-28 RX ORDER — ALPRAZOLAM 0.25 MG/1
0.25 TABLET ORAL ONCE AS NEEDED
Status: DISCONTINUED | OUTPATIENT
Start: 2020-12-28 | End: 2020-12-30 | Stop reason: HOSPADM

## 2020-12-28 RX ORDER — NALOXONE HCL 0.4 MG/ML
0.4 VIAL (ML) INJECTION
Status: DISCONTINUED | OUTPATIENT
Start: 2020-12-28 | End: 2020-12-30 | Stop reason: HOSPADM

## 2020-12-28 RX ORDER — HYDRALAZINE HYDROCHLORIDE 20 MG/ML
10 INJECTION INTRAMUSCULAR; INTRAVENOUS
Status: DISCONTINUED | OUTPATIENT
Start: 2020-12-28 | End: 2020-12-30 | Stop reason: HOSPADM

## 2020-12-28 RX ORDER — SODIUM CHLORIDE 0.9 % (FLUSH) 0.9 %
10 SYRINGE (ML) INJECTION AS NEEDED
Status: DISCONTINUED | OUTPATIENT
Start: 2020-12-28 | End: 2020-12-28 | Stop reason: HOSPADM

## 2020-12-28 RX ORDER — PROPOFOL 10 MG/ML
VIAL (ML) INTRAVENOUS AS NEEDED
Status: DISCONTINUED | OUTPATIENT
Start: 2020-12-28 | End: 2020-12-28 | Stop reason: SURG

## 2020-12-28 RX ORDER — ONDANSETRON 2 MG/ML
INJECTION INTRAMUSCULAR; INTRAVENOUS AS NEEDED
Status: DISCONTINUED | OUTPATIENT
Start: 2020-12-28 | End: 2020-12-28 | Stop reason: SURG

## 2020-12-28 RX ORDER — SODIUM CHLORIDE, SODIUM LACTATE, POTASSIUM CHLORIDE, CALCIUM CHLORIDE 600; 310; 30; 20 MG/100ML; MG/100ML; MG/100ML; MG/100ML
INJECTION, SOLUTION INTRAVENOUS CONTINUOUS PRN
Status: DISCONTINUED | OUTPATIENT
Start: 2020-12-28 | End: 2020-12-28 | Stop reason: SURG

## 2020-12-28 RX ORDER — LEVOFLOXACIN 5 MG/ML
500 INJECTION, SOLUTION INTRAVENOUS EVERY 24 HOURS
Status: COMPLETED | OUTPATIENT
Start: 2020-12-28 | End: 2020-12-28

## 2020-12-28 RX ORDER — DIPHENHYDRAMINE HYDROCHLORIDE 50 MG/ML
25 INJECTION INTRAMUSCULAR; INTRAVENOUS EVERY 4 HOURS PRN
Status: DISCONTINUED | OUTPATIENT
Start: 2020-12-28 | End: 2020-12-30 | Stop reason: HOSPADM

## 2020-12-28 RX ORDER — ROCURONIUM BROMIDE 10 MG/ML
INJECTION, SOLUTION INTRAVENOUS AS NEEDED
Status: DISCONTINUED | OUTPATIENT
Start: 2020-12-28 | End: 2020-12-28 | Stop reason: SURG

## 2020-12-28 RX ADMIN — LEVOFLOXACIN 500 MG: 5 INJECTION, SOLUTION INTRAVENOUS at 17:03

## 2020-12-28 RX ADMIN — CHLORHEXIDINE GLUCONATE 0.12% ORAL RINSE 15 ML: 1.2 LIQUID ORAL at 13:21

## 2020-12-28 RX ADMIN — SODIUM CHLORIDE, POTASSIUM CHLORIDE, SODIUM LACTATE AND CALCIUM CHLORIDE 150 ML/HR: 600; 310; 30; 20 INJECTION, SOLUTION INTRAVENOUS at 13:41

## 2020-12-28 RX ADMIN — ONDANSETRON 4 MG: 2 INJECTION INTRAMUSCULAR; INTRAVENOUS at 17:20

## 2020-12-28 RX ADMIN — ROCURONIUM BROMIDE 10 MG: 10 INJECTION INTRAVENOUS at 18:03

## 2020-12-28 RX ADMIN — SODIUM CHLORIDE, POTASSIUM CHLORIDE, SODIUM LACTATE AND CALCIUM CHLORIDE: 600; 310; 30; 20 INJECTION, SOLUTION INTRAVENOUS at 16:13

## 2020-12-28 RX ADMIN — CHLORHEXIDINE GLUCONATE 0.12% ORAL RINSE 15 ML: 1.2 LIQUID ORAL at 13:18

## 2020-12-28 RX ADMIN — ONDANSETRON 4 MG: 2 INJECTION INTRAMUSCULAR; INTRAVENOUS at 22:59

## 2020-12-28 RX ADMIN — ACETAMINOPHEN 1000 MG: 500 TABLET ORAL at 13:13

## 2020-12-28 RX ADMIN — EPHEDRINE SULFATE 5 MG: 50 INJECTION, SOLUTION INTRAVENOUS at 17:26

## 2020-12-28 RX ADMIN — SODIUM CHLORIDE, PRESERVATIVE FREE 10 ML: 5 INJECTION INTRAVENOUS at 13:20

## 2020-12-28 RX ADMIN — ACETAMINOPHEN 1000 MG: 500 TABLET ORAL at 21:30

## 2020-12-28 RX ADMIN — GABAPENTIN 600 MG: 300 CAPSULE ORAL at 13:13

## 2020-12-28 RX ADMIN — ROCURONIUM BROMIDE 40 MG: 10 INJECTION INTRAVENOUS at 16:58

## 2020-12-28 RX ADMIN — PANTOPRAZOLE SODIUM 40 MG: 40 INJECTION, POWDER, FOR SOLUTION INTRAVENOUS at 13:20

## 2020-12-28 RX ADMIN — LIDOCAINE HYDROCHLORIDE 0.2 ML: 10 INJECTION, SOLUTION EPIDURAL; INFILTRATION; INTRACAUDAL; PERINEURAL at 12:52

## 2020-12-28 RX ADMIN — GABAPENTIN 100 MG: 100 CAPSULE ORAL at 21:30

## 2020-12-28 RX ADMIN — PROPOFOL 25 MCG/KG/MIN: 10 INJECTION, EMULSION INTRAVENOUS at 17:10

## 2020-12-28 RX ADMIN — EPHEDRINE SULFATE 5 MG: 50 INJECTION, SOLUTION INTRAVENOUS at 17:55

## 2020-12-28 RX ADMIN — SODIUM CHLORIDE, POTASSIUM CHLORIDE, SODIUM LACTATE AND CALCIUM CHLORIDE: 600; 310; 30; 20 INJECTION, SOLUTION INTRAVENOUS at 18:07

## 2020-12-28 RX ADMIN — SCOPALAMINE 1 PATCH: 1 PATCH, EXTENDED RELEASE TRANSDERMAL at 13:17

## 2020-12-28 RX ADMIN — FENTANYL CITRATE 250 MCG: 50 INJECTION, SOLUTION INTRAMUSCULAR; INTRAVENOUS at 16:58

## 2020-12-28 RX ADMIN — SODIUM CHLORIDE, POTASSIUM CHLORIDE, SODIUM LACTATE AND CALCIUM CHLORIDE 1000 ML: 600; 310; 30; 20 INJECTION, SOLUTION INTRAVENOUS at 12:52

## 2020-12-28 RX ADMIN — DEXAMETHASONE SODIUM PHOSPHATE 8 MG: 10 INJECTION, SOLUTION INTRAMUSCULAR; INTRAVENOUS at 17:20

## 2020-12-28 RX ADMIN — LIDOCAINE HYDROCHLORIDE 50 MG: 10 INJECTION, SOLUTION INFILTRATION; PERINEURAL at 16:58

## 2020-12-28 RX ADMIN — PROPOFOL 200 MG: 10 INJECTION, EMULSION INTRAVENOUS at 16:58

## 2020-12-28 NOTE — ANESTHESIA PROCEDURE NOTES
Airway  Urgency: elective    Date/Time: 12/28/2020 5:01 PM  Airway not difficult    General Information and Staff    Patient location during procedure: OR  Anesthesiologist: Elio Lee MD    Indications and Patient Condition  Indications for airway management: airway protection    Preoxygenated: yes  MILS not maintained throughout  Mask difficulty assessment: 1 - vent by mask    Final Airway Details  Final airway type: endotracheal airway      Successful airway: ETT  Cuffed: yes   Successful intubation technique: direct laryngoscopy  Endotracheal tube insertion site: oral  Blade: Domingo  Blade size: 3  ETT size (mm): 7.5  Cormack-Lehane Classification: grade I - full view of glottis  Placement verified by: chest auscultation and capnometry   Measured from: lips  ETT/EBT  to lips (cm): 20  Number of attempts at approach: 1  Assessment: lips, teeth, and gum same as pre-op and atraumatic intubation    Additional Comments  Negative epigastric sounds, Breath sound equal bilaterally with symmetric chest rise and fall

## 2020-12-28 NOTE — PROGRESS NOTES
[facility] BARIATRIC SURGERY  [address]  [address]  [facility] [address]  [phone]      Patient  Name:  Jefry Sierra  :  1975      Date of Visit: 2020    Chief Complaint:  weight gain; unable to maintain weight loss.   Evaluate for possible metabolic and bariatric surgery    History of Present Illness:  Jefry Sierra is a 45 y.o. male who presents today for evaluation, education and consultation regarding metabolic and bariatric surgery (MBS).  Since last seen 10/15/2020 he has lost 3 pounds.The patient returns for final visit prior to metabolic and bariatric surgery specifically the sleeve gastrectomy.  Original intake evaluation Susan Moulton PA-C dated 10/15/2020 reviewed.  She noted at that time his maximum lifetime weight was 348 pounds.     The patient has had issues with morbid obesity for years and only temporary success with non-surgical methods of weight loss.  The patient is seeking LSG to help with the morbid obesity related conditions of dyspnea exertion, fatigue, possible hiatal hernia with GE reflux disease, irritable bowel syndrome, joint pain, restless leg syndrome, suspected obstructive sleep apnea, tobacco use disorder in early remission, H. pylori positive gastritis.    45-year-old morbidly obese male from [facility].  He says he cannot tell if his symptoms are better since treatment for H. pylori as he never stopped his proton pump inhibitor.  He denies any gallbladder symptoms.  He denies personal or family history of bleeding disorders or coagulopathies.  Dr. Mendez did his wife's sleeve gastrectomy over a year ago.      Past Medical History:   Diagnosis Date   • Anesthesia complication     wakes w/ terrible headache   • Dyspepsia    • Dyspnea on exertion    • Fatigue    • GERD (gastroesophageal reflux disease)     controlled on Protonix, improved since stopping tobacco, EGD Dr. Glasgow 10/26/2020 small intermittent hiatal hernia  and changes possibly consistent with H. pylori gastritis.   • H. pylori infection     UBT/EGD bx (+) - PCP RX Cipro + Biaxin   • Hyperlipidemia    • IBS (irritable bowel syndrome)     worse w/ dairy or chocolates, denies prior GB eval    • Joint pain     prn Ibuprofen, avoids steroids   • Morbid obesity (CMS/HCC)    • RLS (restless legs syndrome)    • Sleep apnea     suspected, never tested   • Spinal headache    • Tobacco use disorder, moderate, in early remission     snuff quit 9/4/2020     Past Surgical History:   Procedure Laterality Date   • ENDOSCOPY     • WISDOM TOOTH EXTRACTION  2000   • WRIST SURGERY Right 2000    fx repair       Allergies   Allergen Reactions   • Penicillins Rash     reaction as a child - uncertain if can tolerate Keflex       Current Outpatient Medications:   •  pantoprazole (PROTONIX) 40 MG EC tablet, Take 40 mg by mouth every night at bedtime., Disp: , Rfl:   •  lisinopril-hydrochlorothiazide (PRINZIDE,ZESTORETIC) 10-12.5 MG per tablet, Take 1 tablet by mouth Daily., Disp: , Rfl:     Social History     Socioeconomic History   • Marital status:      Spouse name: Not on file   • Number of children: Not on file   • Years of education: Not on file   • Highest education level: Not on file   Tobacco Use   • Smoking status: Never Smoker   • Smokeless tobacco: Former User     Types: Snuff   Substance and Sexual Activity   • Alcohol use: Never     Frequency: Never   • Drug use: Never   • Sexual activity: Defer   Social History Narrative    .  Lives in [place].   w/ [employer].      Family History   Problem Relation Age of Onset   • Hypertension Mother    • Heart disease Mother    • Sleep apnea Mother    • Obesity Brother        Review of Systems   Constitutional: Positive for fatigue. Negative for chills, diaphoresis, fever and unexpected weight loss.   HENT: Negative for congestion and facial swelling.    Eyes: Negative for blurred vision, double vision and discharge.    Respiratory: Negative for chest tightness, shortness of breath and stridor.    Cardiovascular: Negative for chest pain, palpitations and leg swelling.   Gastrointestinal: Positive for GERD. Negative for blood in stool.   Endocrine: Negative for polydipsia.   Genitourinary: Negative for hematuria.   Musculoskeletal: Positive for arthralgias.   Skin: Negative for color change.   Allergic/Immunologic: Negative for immunocompromised state.   Neurological: Negative for confusion.   Psychiatric/Behavioral: Negative for self-injury.       I have reviewed the ROS and confirm that it's accurate today.    Physical Exam:  Vital Signs:  Weight: (!) 146 kg (321 lb 8 oz)   Body mass index is 46.13 kg/m².  Temp: 98.2 °F (36.8 °C)   Heart Rate: 102   BP: 134/76     Physical Exam  Vitals signs reviewed.   Constitutional:       Appearance: He is well-developed.   HENT:      Head: Normocephalic and atraumatic.      Nose:      Comments: mask  Eyes:      Conjunctiva/sclera: Conjunctivae normal.      Pupils: Pupils are equal, round, and reactive to light.   Neck:      Musculoskeletal: Normal range of motion and neck supple.      Thyroid: No thyromegaly.      Vascular: No carotid bruit.      Trachea: No tracheal deviation.   Cardiovascular:      Rate and Rhythm: Regular rhythm. Tachycardia present.      Heart sounds: Normal heart sounds.   Pulmonary:      Effort: Pulmonary effort is normal. No respiratory distress.      Breath sounds: Normal breath sounds.   Abdominal:      General: There is no distension.      Palpations: Abdomen is soft.      Tenderness: There is no abdominal tenderness.   Musculoskeletal: Normal range of motion.         General: No deformity.   Skin:     General: Skin is warm and dry.      Findings: No rash.   Neurological:      Mental Status: He is alert and oriented to person, place, and time.      Cranial Nerves: No cranial nerve deficit.      Coordination: Coordination normal.   Psychiatric:         Behavior:  Behavior normal.         Thought Content: Thought content normal.         Judgment: Judgment normal.         Patient Active Problem List   Diagnosis   • Fatigue   • Dyspepsia   • Dyspnea on exertion   • Morbid obesity (CMS/HCC)   • Sleep apnea   • GERD (gastroesophageal reflux disease)   • IBS (irritable bowel syndrome)   • Joint pain   • RLS (restless legs syndrome)   • Tobacco use   • Anesthesia complication   • H. pylori infection   • Morbid obesity with body mass index (BMI) of 45.0 to 49.9 in adult (CMS/HCC)   • Hiatal hernia with gastroesophageal reflux       Assessment:    Jefry Sierra is a 45 y.o. year old male with medically complicated obesity.    Metabolic and bariatric surgery is deemed medically necessary given the following obesity related comorbidities including dyspnea exertion, fatigue, possible hiatal hernia with GE reflux disease, irritable bowel syndrome, joint pain, restless leg syndrome, suspected obstructive sleep apnea, tobacco use disorder in early remission, H. pylori positive gastritis with current Weight: (!) 146 kg (321 lb 8 oz) and Body mass index is 46.13 kg/m²..    No diagnosis found.   Patient is aware that surgery is a tool, and that weight loss and improvement in comorbidities is not guaranteed but only seen in the context of appropriate use, follow up and physical activity.    The patient was present for an approximately a 2.5 hour discussion of the purpose of MBS, how MBS is a tool to assist in achieving weight loss goals, the most common complications and how best to avoid them, and the strategies for short and long term weight loss and improvement in comorbidities.  Ample opportunity to discuss questions was available both in group and during the time of individual examination.    I reviewed his [facility] report showing 6 phentermine prescriptions this year.  CBC and CMP dated 12/16/2020 unremarkable.  Of note H&H 14.3 and 42.1.  Chest x-ray dated 12/16/2020 no active process.   Psychosocial evaluation dated 10/15/2020 Italo Brown, PhD appropriate candidate.  Dietitian evaluation dated 10/15/2020 Kaur Davis RD.  That day he did have an [employer] sandwich curly fries and a regular Coke in addition to other foods.  EGD dated 10/26/2020 showing diffuse gastritis possibly consistent with H. pylori a small intermittent hiatal hernia Z-line at 40 cm.  I noted at that time the patient had fairly significant reflux well controlled with a PPI and that his symptoms became severe after stopping it the H. pylori tested.  He denied dysphagia.  At that time he said he quit dipping tobacco 7 weeks prior and voiced clear understanding of our tobacco, nicotine and secondhand smoke policy to minimize the risk of delayed leak and said he would comply.  Pathology of the antrum showed active chronic gastritis positive for H. pylori.  Fundus biopsy showed active chronic gastritis also positive for H. pylori.  Distal esophageal biopsies were suggestive of mild reflux and active chronic gastritis no intestinal cell metaplasia.  H. pylori breath test dated 10/30 20+.  Preoperative cardiology evaluation dated 11/9/2020  Dr. Ken Mann.  He noted the patient was a self-referral and that he performed an echocardiogram showing ejection fraction of 60% and normal left ventricular size and wall thickness no significant valvular disease.  He cleared him for bariatric surgery under general anesthesia at low risk.  EKG dated 10/30/2020 showing normal sinus rhythm with left axis deviation and right bundle branch block.  Echocardiogram dated 11/9/2020 showing ejection fraction of 55 to 60%.  Borderline left ventricular hypertrophy with normal function and wall motion.  Trace to mild tricuspid regurgitation.  Negative nicotine testing dated 12/4/2020.  Please see scanned records that I have reviewed and signed off on today.  All of this in addition to the patient's unique history and exam has been taken into consideration  "in determining their appropriate candidacy for MBS.    Complications  of laparoscopic/possible robotic gastric sleeve were discussed. The patient is well aware of the potential complications of surgery that include but not limited to bleeding, infections, deep venous thrombosis, pulmonary embolism, pulmonary complications such as pneumonia, cardiac events, hernias, small bowel obstruction, damage to the spleen or other organs, bowel injury, disfiguring scars, failure to lose weight, need for additional surgery, conversion to an open procedure, and death. Patient is also aware of complications which apply in this particular procedure that can include but are not limited to a \"leak\" at the staple line which in some instances may require conversion to gastric bypass.    The patient is aware if a hiatal hernia is encountered, it likely will be repaired.  R/B/A Rx to hiatal hernia repair were discussed as outlined in our long consent form.  Briefly risks in addition to those for LSG include recurrent hernia, DEON, dysphagia, esophageal injury, pneumothorax, injury to the vagus nerves, injury to the thoracic duct, aorta or vena cava.    I discussed avoiding all tobacco products and second hand smoke at least 2 weeks pre-operatively and 6 weeks post-operatively to minimize the risk of sleeve leak.  This included discussing the importance of avoiding even secondhand smoke as the risk of leak is increased.  Examples discussed:  I made it very clear that the patient understands they should avoid even riding in a car where someone has previously smoked in the last 2 weeks, living in a house where someone smokes (even if it's in a separate room/patio/attached garage, etc.) we discussed that they should not have a conversation with a group of people who are smoking even if it's outside.  They can be around wood burning fires and barbecue.  I told them I do not know if marijuana has a same effects but my overall recommendation is " to avoid it for 2 weeks prior in 6 weeks after surgery.  They also are aware that nicotine may also increase the risk of leak and I strongly encouraged him to avoid that as well for 2 weeks prior in 6 weeks after surgery.    Discussed the risks, benefits and alternative therapies at great length as outlined in our extensive consent forms, consent videos, and educational teaching process under the direction of the center's .    A copy of the patient's signed informed consent is on file.    R/B/A Rx discussed to postop anticoagulation incl but not limited to bleeding, drug reaction, venothromboembolic events, etc. and the patient declined.        Plan: After evaluation today I think the patient is a reasonable candidate for laparoscopic possible robotic assisted sleeve gastrectomy, possible hiatal hernia repair, and EGD.  Levaquin.  Other issues include dyspnea exertion, fatigue, possible hiatal hernia with GE reflux disease, irritable bowel syndrome, joint pain, restless leg syndrome, suspected obstructive sleep apnea, tobacco use disorder in early remission, H. pylori positive gastritis.        Cas Glasgow MD

## 2020-12-28 NOTE — ANESTHESIA PREPROCEDURE EVALUATION
Anesthesia Evaluation     Patient summary reviewed and Nursing notes reviewed   history of anesthetic complications (spinal headache):  NPO Solid Status: > 8 hours  NPO Liquid Status: > 2 hours           Airway   Mallampati: II  TM distance: >3 FB  Neck ROM: full  No difficulty expected  Dental - normal exam     Pulmonary - normal exam   (+) sleep apnea,   Cardiovascular - normal exam  Exercise tolerance: good (4-7 METS)    ECG reviewed    (+) hyperlipidemia,   (-) dysrhythmias, angina, [place]    ROS comment: Cardiac clearance on chart   Normal Echo EF 55-60%    Neuro/Psych  (+) headaches,     GI/Hepatic/Renal/Endo    (+) morbid obesity, GERD,    (-) liver disease, no renal disease, diabetes, no thyroid disorder    Musculoskeletal     Abdominal    Substance History      OB/GYN          Other                        Anesthesia Plan    ASA 3     general with block     intravenous induction     Anesthetic plan, all risks, benefits, and alternatives have been provided, discussed and informed consent has been obtained with: patient.    Plan discussed with CRNA.

## 2020-12-28 NOTE — ANESTHESIA POSTPROCEDURE EVALUATION
Patient: Jefry Sierra    Procedure Summary     Date: 12/28/20 Room / Location:  [facility] /  [facility]    Anesthesia Start: 1653 Anesthesia Stop: 1854    Procedures:       GASTRIC SLEEVE LAPAROSCOPIC (N/A Abdomen)      ESOPHAGOGASTRODUODENOSCOPY (N/A Esophagus) Diagnosis:       Morbid obesity with body mass index (BMI) of 45.0 to 49.9 in adult (CMS/[facility])      (Morbid obesity with body mass index (BMI) of 45.0 to 49.9 in adult (CMS/[facility]) [E66.01, Z68.42])      (Hiatal hernia with gastroesophageal reflux [K21.9, K44.9])    Surgeon: Cas Glasgow MD Provider: Mando Serna MD    Anesthesia Type: general with block ASA Status: 3          Anesthesia Type: general with block    Vitals  Vitals Value Taken Time   /65 12/28/20 1853   Temp 97.9 °F (36.6 °C) 12/28/20 1853   Pulse 68 12/28/20 1853   Resp 16 12/28/20 1853   SpO2 93 % 12/28/20 1853           Post Anesthesia Care and Evaluation    Patient location during evaluation: PACU  Patient participation: complete - patient participated  Level of consciousness: awake and alert  Pain management: adequate  Airway patency: patent  Anesthetic complications: No anesthetic complications  PONV Status: none  Cardiovascular status: hemodynamically stable and acceptable  Respiratory status: nonlabored ventilation, acceptable and nasal cannula  Hydration status: acceptable

## 2020-12-29 ENCOUNTER — APPOINTMENT (OUTPATIENT)
Dept: GENERAL RADIOLOGY | Facility: HOSPITAL | Age: 45
End: 2020-12-29

## 2020-12-29 VITALS
DIASTOLIC BLOOD PRESSURE: 88 MMHG | TEMPERATURE: 98.3 F | WEIGHT: 315 LBS | OXYGEN SATURATION: 95 % | RESPIRATION RATE: 18 BRPM | BODY MASS INDEX: 45.1 KG/M2 | HEART RATE: 77 BPM | SYSTOLIC BLOOD PRESSURE: 158 MMHG | HEIGHT: 70 IN

## 2020-12-29 LAB
ALBUMIN SERPL-MCNC: 4 G/DL (ref 3.5–5.2)
ALBUMIN/GLOB SERPL: 1.2 G/DL
ALP SERPL-CCNC: 54 U/L (ref 39–117)
ALT SERPL W P-5'-P-CCNC: 52 U/L (ref 1–41)
ANION GAP SERPL CALCULATED.3IONS-SCNC: 8 MMOL/L (ref 5–15)
AST SERPL-CCNC: 37 U/L (ref 1–40)
BASOPHILS # BLD AUTO: 0.01 10*3/MM3 (ref 0–0.2)
BASOPHILS NFR BLD AUTO: 0.1 % (ref 0–1.5)
BILIRUB SERPL-MCNC: 0.4 MG/DL (ref 0–1.2)
BUN SERPL-MCNC: 11 MG/DL (ref 6–20)
BUN/CREAT SERPL: 13.9 (ref 7–25)
CALCIUM SPEC-SCNC: 8.8 MG/DL (ref 8.6–10.5)
CHLORIDE SERPL-SCNC: 107 MMOL/L (ref 98–107)
CO2 SERPL-SCNC: 27 MMOL/L (ref 22–29)
CREAT SERPL-MCNC: 0.79 MG/DL (ref 0.76–1.27)
DEPRECATED RDW RBC AUTO: 41.8 FL (ref 37–54)
EOSINOPHIL # BLD AUTO: 0 10*3/MM3 (ref 0–0.4)
EOSINOPHIL NFR BLD AUTO: 0 % (ref 0.3–6.2)
ERYTHROCYTE [DISTWIDTH] IN BLOOD BY AUTOMATED COUNT: 13.8 % (ref 12.3–15.4)
GFR SERPL CREATININE-BSD FRML MDRD: 107 ML/MIN/1.73
GLOBULIN UR ELPH-MCNC: 3.4 GM/DL
GLUCOSE SERPL-MCNC: 134 MG/DL (ref 65–99)
HCT VFR BLD AUTO: 41.8 % (ref 37.5–51)
HGB BLD-MCNC: 13.5 G/DL (ref 13–17.7)
IMM GRANULOCYTES # BLD AUTO: 0.05 10*3/MM3 (ref 0–0.05)
IMM GRANULOCYTES NFR BLD AUTO: 0.5 % (ref 0–0.5)
IRON 24H UR-MRATE: 38 MCG/DL (ref 59–158)
LYMPHOCYTES # BLD AUTO: 0.83 10*3/MM3 (ref 0.7–3.1)
LYMPHOCYTES NFR BLD AUTO: 7.7 % (ref 19.6–45.3)
MCH RBC QN AUTO: 26.8 PG (ref 26.6–33)
MCHC RBC AUTO-ENTMCNC: 32.3 G/DL (ref 31.5–35.7)
MCV RBC AUTO: 83.1 FL (ref 79–97)
MONOCYTES # BLD AUTO: 0.37 10*3/MM3 (ref 0.1–0.9)
MONOCYTES NFR BLD AUTO: 3.4 % (ref 5–12)
NEUTROPHILS NFR BLD AUTO: 88.3 % (ref 42.7–76)
NEUTROPHILS NFR BLD AUTO: 9.54 10*3/MM3 (ref 1.7–7)
NRBC BLD AUTO-RTO: 0 /100 WBC (ref 0–0.2)
PLATELET # BLD AUTO: 316 10*3/MM3 (ref 140–450)
PMV BLD AUTO: 11.1 FL (ref 6–12)
POTASSIUM SERPL-SCNC: 4.6 MMOL/L (ref 3.5–5.2)
PROT SERPL-MCNC: 7.4 G/DL (ref 6–8.5)
RBC # BLD AUTO: 5.03 10*6/MM3 (ref 4.14–5.8)
SODIUM SERPL-SCNC: 142 MMOL/L (ref 136–145)
WBC # BLD AUTO: 10.8 10*3/MM3 (ref 3.4–10.8)

## 2020-12-29 PROCEDURE — 74240 X-RAY XM UPR GI TRC 1CNTRST: CPT

## 2020-12-29 PROCEDURE — 25010000003 POTASSIUM CHLORIDE PER 2 MEQ: Performed by: SURGERY

## 2020-12-29 PROCEDURE — 25010000002 ENOXAPARIN PER 10 MG: Performed by: SURGERY

## 2020-12-29 PROCEDURE — 25010000002 THIAMINE PER 100 MG: Performed by: SURGERY

## 2020-12-29 PROCEDURE — 80053 COMPREHEN METABOLIC PANEL: CPT | Performed by: SURGERY

## 2020-12-29 PROCEDURE — 25010000002 CYANOCOBALAMIN PER 1000 MCG: Performed by: SURGERY

## 2020-12-29 PROCEDURE — 25010000002 LEVOFLOXACIN PER 250 MG: Performed by: SURGERY

## 2020-12-29 PROCEDURE — 25010000002 ONDANSETRON PER 1 MG: Performed by: SURGERY

## 2020-12-29 PROCEDURE — 99024 POSTOP FOLLOW-UP VISIT: CPT | Performed by: SURGERY

## 2020-12-29 PROCEDURE — 85025 COMPLETE CBC W/AUTO DIFF WBC: CPT | Performed by: SURGERY

## 2020-12-29 PROCEDURE — 83540 ASSAY OF IRON: CPT | Performed by: SURGERY

## 2020-12-29 PROCEDURE — 0 DIATRIZOATE MEGLUMINE & SODIUM PER 1 ML: Performed by: SURGERY

## 2020-12-29 RX ORDER — LISINOPRIL 20 MG/1
10 TABLET ORAL DAILY
Qty: 30 TABLET | Refills: 0 | Status: SHIPPED | OUTPATIENT
Start: 2020-12-29 | End: 2021-06-28

## 2020-12-29 RX ORDER — OXYCODONE HYDROCHLORIDE 5 MG/1
5 TABLET ORAL EVERY 4 HOURS PRN
Qty: 10 TABLET | Refills: 0 | Status: SHIPPED | OUTPATIENT
Start: 2020-12-29 | End: 2021-01-25

## 2020-12-29 RX ORDER — PROMETHAZINE HYDROCHLORIDE 12.5 MG/1
12.5 TABLET ORAL EVERY 4 HOURS PRN
Qty: 8 TABLET | Refills: 0 | Status: SHIPPED | OUTPATIENT
Start: 2020-12-29 | End: 2021-01-25

## 2020-12-29 RX ORDER — ONDANSETRON 4 MG/1
4 TABLET, FILM COATED ORAL EVERY 4 HOURS PRN
Qty: 8 TABLET | Refills: 0 | Status: SHIPPED | OUTPATIENT
Start: 2020-12-29 | End: 2021-01-25

## 2020-12-29 RX ADMIN — PANTOPRAZOLE SODIUM 40 MG: 40 INJECTION, POWDER, FOR SOLUTION INTRAVENOUS at 05:42

## 2020-12-29 RX ADMIN — ENOXAPARIN SODIUM 40 MG: 40 INJECTION SUBCUTANEOUS at 08:18

## 2020-12-29 RX ADMIN — OXYCODONE 5 MG: 5 TABLET ORAL at 08:33

## 2020-12-29 RX ADMIN — OXYCODONE 5 MG: 5 TABLET ORAL at 16:53

## 2020-12-29 RX ADMIN — FOLIC ACID 250 ML/HR: 5 INJECTION, SOLUTION INTRAMUSCULAR; INTRAVENOUS; SUBCUTANEOUS at 08:18

## 2020-12-29 RX ADMIN — LEVOFLOXACIN 500 MG: 5 INJECTION, SOLUTION INTRAVENOUS at 16:41

## 2020-12-29 RX ADMIN — POTASSIUM CHLORIDE AND SODIUM CHLORIDE 125 ML/HR: 450; 150 INJECTION, SOLUTION INTRAVENOUS at 08:18

## 2020-12-29 RX ADMIN — LISINOPRIL 10 MG: 10 TABLET ORAL at 08:17

## 2020-12-29 RX ADMIN — ONDANSETRON 4 MG: 2 INJECTION INTRAMUSCULAR; INTRAVENOUS at 05:42

## 2020-12-29 RX ADMIN — Medication 30 ML: at 09:40

## 2020-12-29 RX ADMIN — ACETAMINOPHEN 1000 MG: 500 TABLET ORAL at 05:42

## 2020-12-29 RX ADMIN — GABAPENTIN 100 MG: 100 CAPSULE ORAL at 16:40

## 2020-12-29 RX ADMIN — GABAPENTIN 100 MG: 100 CAPSULE ORAL at 08:18

## 2020-12-29 RX ADMIN — CYANOCOBALAMIN 1000 MCG: 1000 INJECTION, SOLUTION INTRAMUSCULAR; SUBCUTANEOUS at 08:17

## 2020-12-29 RX ADMIN — ACETAMINOPHEN 1000 MG: 500 TABLET ORAL at 14:02

## 2021-01-04 ENCOUNTER — OFFICE VISIT (OUTPATIENT)
Dept: BARIATRICS/WEIGHT MGMT | Facility: CLINIC | Age: 46
End: 2021-01-04

## 2021-01-04 VITALS
RESPIRATION RATE: 18 BRPM | WEIGHT: 308.5 LBS | TEMPERATURE: 98 F | BODY MASS INDEX: 44.16 KG/M2 | HEIGHT: 70 IN | OXYGEN SATURATION: 96 % | DIASTOLIC BLOOD PRESSURE: 80 MMHG | HEART RATE: 87 BPM | SYSTOLIC BLOOD PRESSURE: 130 MMHG

## 2021-01-04 DIAGNOSIS — Z98.84 S/P BARIATRIC SURGERY: Primary | ICD-10-CM

## 2021-01-04 PROCEDURE — 99024 POSTOP FOLLOW-UP VISIT: CPT | Performed by: PHYSICIAN ASSISTANT

## 2021-01-04 RX ORDER — URSODIOL 300 MG/1
300 CAPSULE ORAL 2 TIMES DAILY
Qty: 60 CAPSULE | Refills: 5 | Status: SHIPPED | OUTPATIENT
Start: 2021-01-04 | End: 2021-03-25

## 2021-01-04 NOTE — PROGRESS NOTES
[facility] Bariatric Surgery  [address]  [address]  [facility] [id number] [phone]      Patient Name:  Jefry Sierra  :  1975      Date of Visit: 2021      Reason for Visit:  POD #7    HPI:  Jefry Sierra is a 45 y.o. male s/p LSG 20 GDW    Discharged on POD#1.  Doing well.  No issues/concerns.  Denies dysphagia, reflux, nausea, vomiting, abdominal pain, pulmonary issues and fevers.  Tolerating diet progression - on stage 2.  Getting 120g prot/day.  Drinking 64 fluid oz/day.  Not yet taking vitamins.  On Pantoprazole.  Needs Actigall RX.  Holding Diuretics .  Ambulating.     Presurgery weight: 321 pounds.  Today's weight is (!) 140 kg (308 lb 8 oz) pounds, today's  Body mass index is 44.27 kg/m²., and weight loss since surgery is 13 pounds.       Final Diagnosis   STOMACH, SUBTOTAL GASTRECTOMY:               Stomach with reactive oxyntic-type mucosa with mild chronic gastritis.               No Helicobacter pylori-like organisms seen.               Negative for dysplasia or malignancy.         Past Medical History:   Diagnosis Date   • Anesthesia complication     wakes w/ terrible headache   • Dyspepsia    • Dyspnea on exertion    • Fatigue    • GERD (gastroesophageal reflux disease)     controlled on Protonix, improved since stopping tobacco, EGD Dr. Glasgow 10/26/2020 small intermittent hiatal hernia and changes possibly consistent with H. pylori gastritis.   • H. pylori infection     UBT/EGD bx (+) - PCP RX Cipro + Biaxin   • Hyperlipidemia    • IBS (irritable bowel syndrome)     worse w/ dairy or chocolates, denies prior GB eval    • Joint pain     prn Ibuprofen, avoids steroids   • Morbid obesity (CMS/HCC)    • RLS (restless legs syndrome)    • Sleep apnea     suspected, never tested   • Spinal headache    • Tobacco use disorder, moderate, in early remission     snuff quit 2020     Past Surgical History:   Procedure Laterality Date   • ENDOSCOPY     •  "ENDOSCOPY N/A 12/28/2020    Procedure: ESOPHAGOGASTRODUODENOSCOPY;  Surgeon: Cas Glasgow MD;  Location:  [facility];  Service: Bariatric;  Laterality: N/A;   • GASTRIC SLEEVE LAPAROSCOPIC N/A 12/28/2020    Procedure: GASTRIC SLEEVE LAPAROSCOPIC;  Surgeon: Cas Glasgow MD;  Location:  [facility];  Service: Bariatric;  Laterality: N/A;   • WISDOM TOOTH EXTRACTION  2000   • WRIST SURGERY Right 2000    fx repair     Outpatient Medications Marked as Taking for the 1/4/21 encounter (Office Visit) with Susan Clements PA   Medication Sig Dispense Refill   • lisinopril (PRINIVIL,ZESTRIL) 20 MG tablet Take 0.5 tablets by mouth Daily. 30 tablet 0   • pantoprazole (PROTONIX) 40 MG EC tablet Take 40 mg by mouth every night at bedtime.       Allergies   Allergen Reactions   • Penicillins Rash     reaction as a child - uncertain if can tolerate Keflex       Social History     Socioeconomic History   • Marital status:      Spouse name: Not on file   • Number of children: Not on file   • Years of education: Not on file   • Highest education level: Not on file   Tobacco Use   • Smoking status: Never Smoker   • Smokeless tobacco: Former User     Types: Snuff   Substance and Sexual Activity   • Alcohol use: Never     Frequency: Never   • Drug use: Never   • Sexual activity: Defer   Social History Narrative    .  Lives in [place].   w/ [employer].        /80 (BP Location: Left arm, Patient Position: Sitting, Cuff Size: Adult)   Pulse 87   Temp 98 °F (36.7 °C) (Temporal)   Resp 18   Ht 177.8 cm (70\")   Wt (!) 140 kg (308 lb 8 oz)   SpO2 96%   BMI 44.27 kg/m²   Physical Exam  Constitutional:       Appearance: He is well-developed.      Comments: Wearing a mask   Cardiovascular:      Rate and Rhythm: Normal rate and regular rhythm.   Pulmonary:      Effort: Pulmonary effort is normal.   Abdominal:      Palpations: Abdomen is soft.      Tenderness: There is no abdominal tenderness.    " 14-Dec-2019   Hernia: No hernia is present.      Comments: Incisions healing well   Musculoskeletal: Normal range of motion.   Skin:     General: Skin is warm and dry.   Neurological:      Mental Status: He is alert.           Assessment:   POD #7 s/p LSG 12/28/20 GDW      Patient's Body mass index is 44.27 kg/m². BMI is above normal parameters. Recommendations include: exercise counseling and nutrition counseling.      Plan:  Doing well.  Continue to advance diet per manual.  Continue protein 100g/day.  Increase exercise/activity as tolerated.  Reviewed lifting restrictions, nothing >25 lbs x 2 more weeks.  Start vitamins as discussed.  Start Actigall as prescribed.  Continue PPI.  Continue to avoid ASA/NSAIDs/tobacco x 6 weeks postop, steroids x 8 weeks postop.  Call w/ problems/concerns.    The patient was instructed to follow up in 3 weeks, sooner if needed.

## 2021-01-25 ENCOUNTER — OFFICE VISIT (OUTPATIENT)
Dept: BARIATRICS/WEIGHT MGMT | Facility: CLINIC | Age: 46
End: 2021-01-25

## 2021-01-25 VITALS
BODY MASS INDEX: 42.95 KG/M2 | HEART RATE: 72 BPM | TEMPERATURE: 97.3 F | WEIGHT: 300 LBS | HEIGHT: 70 IN | OXYGEN SATURATION: 99 % | DIASTOLIC BLOOD PRESSURE: 76 MMHG | SYSTOLIC BLOOD PRESSURE: 122 MMHG | RESPIRATION RATE: 18 BRPM

## 2021-01-25 DIAGNOSIS — E66.01 OBESITY, CLASS III, BMI 40-49.9 (MORBID OBESITY) (HCC): Primary | ICD-10-CM

## 2021-01-25 DIAGNOSIS — G47.30 SLEEP APNEA, UNSPECIFIED TYPE: ICD-10-CM

## 2021-01-25 DIAGNOSIS — R79.0 ABNORMAL BLOOD LEVEL OF IRON: ICD-10-CM

## 2021-01-25 DIAGNOSIS — R53.83 FATIGUE, UNSPECIFIED TYPE: ICD-10-CM

## 2021-01-25 DIAGNOSIS — E55.9 VITAMIN D DEFICIENCY: ICD-10-CM

## 2021-01-25 DIAGNOSIS — I10 ESSENTIAL HYPERTENSION: ICD-10-CM

## 2021-01-25 PROCEDURE — 99024 POSTOP FOLLOW-UP VISIT: CPT | Performed by: PHYSICIAN ASSISTANT

## 2021-01-25 RX ORDER — URSODIOL 250 MG/1
250 TABLET, FILM COATED ORAL 2 TIMES DAILY
Qty: 60 TABLET | Refills: 4 | Status: SHIPPED | OUTPATIENT
Start: 2021-01-25 | End: 2021-06-28

## 2021-01-25 NOTE — PROGRESS NOTES
[facility] Bariatric Surgery  [address]  [address]  [facility] [id number] [phone]      Patient Name:  Jefry Sierra  :  1975      Date of Visit: 2021      Reason for Visit:  POD #28    HPI:  Jefry Sierra is a 45 y.o. male s/p LSG 20 GDW    Feeling good.  No issues/concerns.  Denies dysphagia, reflux, nausea, vomiting, abdominal pain, pulmonary issues and fevers.  Tolerating diet progression - on stage 5.  Getting 100+g prot/day.  Drinking 64 fluid oz/day.  Wearing MVI + iron patches.  On Pantoprazole.  Taking Actigall but asking for a more affordable option.  Ambulating frequently.     Presurgery weight: 321 pounds.  Today's weight is 136 kg (300 lb) pounds, today's  Body mass index is 43.05 kg/m²., and weight loss since surgery is 21 pounds.         Past Medical History:   Diagnosis Date   • Anesthesia complication     wakes w/ terrible headache   • Dyspepsia    • Dyspnea on exertion    • Fatigue    • GERD (gastroesophageal reflux disease)     controlled on Protonix, improved since stopping tobacco, EGD Dr. Glasgow 10/26/2020 small intermittent hiatal hernia and changes possibly consistent with H. pylori gastritis.   • H. pylori infection     UBT/EGD bx (+) - PCP RX Cipro + Biaxin   • Hyperlipidemia    • IBS (irritable bowel syndrome)     worse w/ dairy or chocolates, denies prior GB eval    • Joint pain     prn Ibuprofen, avoids steroids   • Morbid obesity (CMS/HCC)    • RLS (restless legs syndrome)    • Sleep apnea     suspected, never tested   • Spinal headache    • Tobacco use disorder, moderate, in early remission     snuff quit 2020     Past Surgical History:   Procedure Laterality Date   • ENDOSCOPY     • ENDOSCOPY N/A 2020    Procedure: ESOPHAGOGASTRODUODENOSCOPY;  Surgeon: Cas Glasgow MD;  Location: [facility];  Service: Bariatric;  Laterality: N/A;   • GASTRIC SLEEVE LAPAROSCOPIC N/A 2020    Procedure: GASTRIC SLEEVE  "LAPAROSCOPIC;  Surgeon: Cas Glasgow MD;  Location: [facility];  Service: Bariatric;  Laterality: N/A;   • WISDOM TOOTH EXTRACTION  2000   • WRIST SURGERY Right 2000    fx repair     Outpatient Medications Marked as Taking for the 1/25/21 encounter (Office Visit) with Susan Clements PA   Medication Sig Dispense Refill   • lisinopril (PRINIVIL,ZESTRIL) 20 MG tablet Take 0.5 tablets by mouth Daily. 30 tablet 0   • pantoprazole (PROTONIX) 40 MG EC tablet Take 40 mg by mouth every night at bedtime.     • ursodiol (ACTIGALL) 300 MG capsule Take 1 capsule by mouth 2 (Two) Times a Day. 60 capsule 5     Allergies   Allergen Reactions   • Penicillins Rash     reaction as a child - uncertain if can tolerate Keflex       Social History     Socioeconomic History   • Marital status:      Spouse name: Not on file   • Number of children: Not on file   • Years of education: Not on file   • Highest education level: Not on file   Tobacco Use   • Smoking status: Never Smoker   • Smokeless tobacco: Former User     Types: Snuff   Substance and Sexual Activity   • Alcohol use: Never     Frequency: Never   • Drug use: Never   • Sexual activity: Defer   Social History Narrative    .  Lives in [place].   w/ [employer].        /76 (BP Location: Left arm, Patient Position: Sitting, Cuff Size: Adult)   Pulse 72   Temp 97.3 °F (36.3 °C) (Temporal)   Resp 18   Ht 177.8 cm (70\")   Wt 136 kg (300 lb)   SpO2 99%   BMI 43.05 kg/m²   Physical Exam  Constitutional:       Appearance: He is well-developed.      Comments: Wearing a mask   Cardiovascular:      Rate and Rhythm: Normal rate and regular rhythm.   Pulmonary:      Effort: Pulmonary effort is normal.   Abdominal:      Palpations: Abdomen is soft.      Tenderness: There is no abdominal tenderness.      Hernia: No hernia is present.      Comments: Incisions healing well   Musculoskeletal: Normal range of motion.   Skin:     General: Skin is warm " and dry.   Neurological:      Mental Status: He is alert.           Assessment:   POD #28 s/p LSG 12/28/20 GDW      ICD-10-CM ICD-9-CM   1. Obesity, Class III, BMI 40-49.9 (morbid obesity) (CMS/[facility])  E66.01 278.01   2. Fatigue, unspecified type  R53.83 780.79   3. Vitamin D deficiency  E55.9 268.9   4. Abnormal blood level of iron  R79.0 790.6   5. Essential hypertension  I10 401.9   6. Sleep apnea, unspecified type  G47.30 780.57         Plan:  Doing well.  Continue to advance diet per manual.  Continue protein 100g/day.  Increase exercise/activity as tolerated.  Routine bariatric labs ordered.  Continue vitamins w/ adjustments pending lab results.  Continue PPI.  Continue to avoid ASA/NSAIDs/tobacco x 6 weeks postop, steroids x 8 weeks postop.  Will RX Actigall tablets (to see if more affordable).  Call w/ problems/concerns.    The patient was instructed to follow up in 2 months, sooner if needed.

## 2021-01-29 LAB
25(OH)D3+25(OH)D2 SERPL-MCNC: 31.3 NG/ML (ref 30–100)
ALBUMIN SERPL-MCNC: 4.1 G/DL (ref 3.5–5.2)
ALBUMIN/GLOB SERPL: 1.6 G/DL
ALP SERPL-CCNC: 75 U/L (ref 39–117)
ALT SERPL-CCNC: 29 U/L (ref 1–41)
AST SERPL-CCNC: 24 U/L (ref 1–40)
BASOPHILS # BLD AUTO: 0.04 10*3/MM3 (ref 0–0.2)
BASOPHILS NFR BLD AUTO: 0.6 % (ref 0–1.5)
BILIRUB SERPL-MCNC: 0.4 MG/DL (ref 0–1.2)
BUN SERPL-MCNC: 13 MG/DL (ref 6–20)
BUN/CREAT SERPL: 17.6 (ref 7–25)
CALCIUM SERPL-MCNC: 9.5 MG/DL (ref 8.6–10.5)
CHLORIDE SERPL-SCNC: 108 MMOL/L (ref 98–107)
CO2 SERPL-SCNC: 26.3 MMOL/L (ref 22–29)
CREAT SERPL-MCNC: 0.74 MG/DL (ref 0.76–1.27)
EOSINOPHIL # BLD AUTO: 0.21 10*3/MM3 (ref 0–0.4)
EOSINOPHIL NFR BLD AUTO: 3.4 % (ref 0.3–6.2)
ERYTHROCYTE [DISTWIDTH] IN BLOOD BY AUTOMATED COUNT: 14.5 % (ref 12.3–15.4)
FERRITIN SERPL-MCNC: 23 NG/ML (ref 30–400)
FOLATE SERPL-MCNC: 10.8 NG/ML (ref 4.78–24.2)
GLOBULIN SER CALC-MCNC: 2.5 GM/DL
GLUCOSE SERPL-MCNC: 104 MG/DL (ref 65–99)
HCT VFR BLD AUTO: 39.5 % (ref 37.5–51)
HGB BLD-MCNC: 13.5 G/DL (ref 13–17.7)
IMM GRANULOCYTES # BLD AUTO: 0.01 10*3/MM3 (ref 0–0.05)
IMM GRANULOCYTES NFR BLD AUTO: 0.2 % (ref 0–0.5)
IRON SERPL-MCNC: 46 MCG/DL (ref 59–158)
LYMPHOCYTES # BLD AUTO: 1.82 10*3/MM3 (ref 0.7–3.1)
LYMPHOCYTES NFR BLD AUTO: 29.2 % (ref 19.6–45.3)
Lab: NORMAL
MCH RBC QN AUTO: 27.3 PG (ref 26.6–33)
MCHC RBC AUTO-ENTMCNC: 34.2 G/DL (ref 31.5–35.7)
MCV RBC AUTO: 80 FL (ref 79–97)
METHYLMALONATE SERPL-SCNC: 189 NMOL/L (ref 0–378)
MONOCYTES # BLD AUTO: 0.47 10*3/MM3 (ref 0.1–0.9)
MONOCYTES NFR BLD AUTO: 7.5 % (ref 5–12)
NEUTROPHILS # BLD AUTO: 3.68 10*3/MM3 (ref 1.7–7)
NEUTROPHILS NFR BLD AUTO: 59.1 % (ref 42.7–76)
NRBC BLD AUTO-RTO: 0 /100 WBC (ref 0–0.2)
PLATELET # BLD AUTO: 313 10*3/MM3 (ref 140–450)
POTASSIUM SERPL-SCNC: 4.3 MMOL/L (ref 3.5–5.2)
PREALB SERPL-MCNC: 20 MG/DL (ref 12–34)
PROT SERPL-MCNC: 6.6 G/DL (ref 6–8.5)
RBC # BLD AUTO: 4.94 10*6/MM3 (ref 4.14–5.8)
SODIUM SERPL-SCNC: 143 MMOL/L (ref 136–145)
VIT B1 BLD-SCNC: 111.5 NMOL/L (ref 66.5–200)
WBC # BLD AUTO: 6.23 10*3/MM3 (ref 3.4–10.8)

## 2021-02-15 RX ORDER — LISINOPRIL 20 MG/1
TABLET ORAL
Qty: 15 TABLET | Refills: 1 | OUTPATIENT
Start: 2021-02-15

## 2021-03-09 ENCOUNTER — IMMUNIZATION (OUTPATIENT)
Dept: VACCINE CLINIC | Facility: HOSPITAL | Age: 46
End: 2021-03-09

## 2021-03-09 PROCEDURE — 91300 HC SARSCOV02 VAC 30MCG/0.3ML IM: CPT | Performed by: INTERNAL MEDICINE

## 2021-03-09 PROCEDURE — 0001A: CPT | Performed by: INTERNAL MEDICINE

## 2021-03-25 ENCOUNTER — OFFICE VISIT (OUTPATIENT)
Dept: BARIATRICS/WEIGHT MGMT | Facility: CLINIC | Age: 46
End: 2021-03-25

## 2021-03-25 VITALS
HEIGHT: 70 IN | OXYGEN SATURATION: 99 % | RESPIRATION RATE: 18 BRPM | WEIGHT: 284 LBS | DIASTOLIC BLOOD PRESSURE: 64 MMHG | BODY MASS INDEX: 40.66 KG/M2 | HEART RATE: 68 BPM | SYSTOLIC BLOOD PRESSURE: 122 MMHG | TEMPERATURE: 97.4 F

## 2021-03-25 DIAGNOSIS — Z98.84 STATUS POST BARIATRIC SURGERY: Primary | ICD-10-CM

## 2021-03-25 DIAGNOSIS — Z13.0 SCREENING, IRON DEFICIENCY ANEMIA: ICD-10-CM

## 2021-03-25 DIAGNOSIS — Z13.21 MALNUTRITION SCREEN: ICD-10-CM

## 2021-03-25 DIAGNOSIS — E66.01 OBESITY, CLASS III, BMI 40-49.9 (MORBID OBESITY) (HCC): ICD-10-CM

## 2021-03-25 DIAGNOSIS — K91.2 POSTGASTRECTOMY MALABSORPTION: ICD-10-CM

## 2021-03-25 DIAGNOSIS — R53.83 FATIGUE, UNSPECIFIED TYPE: ICD-10-CM

## 2021-03-25 DIAGNOSIS — Z90.3 POSTGASTRECTOMY MALABSORPTION: ICD-10-CM

## 2021-03-25 DIAGNOSIS — E55.9 HYPOVITAMINOSIS D: ICD-10-CM

## 2021-03-25 PROCEDURE — 99024 POSTOP FOLLOW-UP VISIT: CPT | Performed by: PHYSICIAN ASSISTANT

## 2021-03-25 NOTE — PROGRESS NOTES
[facility] Bariatric Surgery  [address]  [address]  [facility] [address] [phone]        Patient Name:  Jefry Sierra.  :  1975      Reason for Visit:   3 months postop      HPI: Jefry Sierra is a 46 y.o. male  s/p LSG 20 GDW    Doing well.  No issues/concerns. Feeling good. Pleased with progress.  Denies dysphagia, reflux, nausea, vomiting, abdominal pain, pulmonary issues and fevers.  Getting 100-120g prot/day. protien shake in morning, bar for lunch, shake in afternoon, meal for dinner. Pork rinds and PB cups for snack.   Drinking 64 fluid oz/day.  1 month labs revealed low iron, advised increase to BID. Taking MVI (had been on patches, just switched to MVI). Was on iron patch and added a pill. Has switched to BID iron pills recently. On Pantoprazole and Actigall .  Exercising- started walking at lunch breaks.     Presurgery weight: 321 pounds.  Today's weight is 129 kg (284 lb) pounds, today's  Body mass index is 40.75 kg/m²., and@ weight loss since surgery is 37 pounds.      Past Medical History:   Diagnosis Date   • Anesthesia complication     wakes w/ terrible headache   • Dyspepsia    • Dyspnea on exertion    • Fatigue    • GERD (gastroesophageal reflux disease)     controlled on Protonix, improved since stopping tobacco, EGD Dr. Glasgow 10/26/2020 small intermittent hiatal hernia and changes possibly consistent with H. pylori gastritis.   • H. pylori infection     UBT/EGD bx (+) - PCP RX Cipro + Biaxin   • Hyperlipidemia    • IBS (irritable bowel syndrome)     worse w/ dairy or chocolates, denies prior GB eval    • Joint pain     prn Ibuprofen, avoids steroids   • Morbid obesity (CMS/HCC)    • RLS (restless legs syndrome)    • Sleep apnea     suspected, never tested   • Spinal headache    • Tobacco use disorder, moderate, in early remission     snuff quit 2020     Past Surgical History:   Procedure Laterality Date   • ENDOSCOPY     • ENDOSCOPY N/A  "12/28/2020    Procedure: ESOPHAGOGASTRODUODENOSCOPY;  Surgeon: Cas Glasgow MD;  [facility]:  [facility];  Service: Bariatric;  Laterality: N/A;   • GASTRIC SLEEVE LAPAROSCOPIC N/A 12/28/2020    Procedure: GASTRIC SLEEVE LAPAROSCOPIC;  Surgeon: Cas Glasgow MD;  [facility]:  [facility];  Service: Bariatric;  Laterality: N/A;   • WISDOM TOOTH EXTRACTION  2000   • WRIST SURGERY Right 2000    fx repair     Outpatient Medications Marked as Taking for the 3/25/21 encounter (Office Visit) with Bela Medel PA-C   Medication Sig Dispense Refill   • lisinopril (PRINIVIL,ZESTRIL) 20 MG tablet Take 0.5 tablets by mouth Daily. 30 tablet 0   • pantoprazole (PROTONIX) 40 MG EC tablet Take 40 mg by mouth every night at bedtime.     • ursodiol (ACTIGALL) 250 MG tablet Take 1 tablet by mouth 2 (Two) Times a Day. 60 tablet 4       Allergies   Allergen Reactions   • Penicillins Rash     reaction as a child - uncertain if can tolerate Keflex       Social History     Socioeconomic History   • Marital status:      Spouse name: Not on file   • Number of children: Not on file   • Years of education: Not on file   • Highest education level: Not on file   Tobacco Use   • Smoking status: Never Smoker   • Smokeless tobacco: Former User     Types: Snuff   Vaping Use   • Vaping Use: Never used   Substance and Sexual Activity   • Alcohol use: Never   • Drug use: Never   • Sexual activity: Defer       /64 (BP Location: Left arm, Patient Position: Sitting, Cuff Size: Large Adult)   Pulse 68   Temp 97.4 °F (36.3 °C) (Temporal)   Resp 18   Ht 177.8 cm (70\")   Wt 129 kg (284 lb)   SpO2 99%   BMI 40.75 kg/m²     Physical Exam  Constitutional:       Appearance: He is well-developed.   HENT:      Head: Normocephalic and atraumatic.   Cardiovascular:      Rate and Rhythm: Normal rate and regular rhythm.   Pulmonary:      Effort: Pulmonary effort is normal.      Breath sounds: Normal breath sounds.   Abdominal:      " General: Bowel sounds are normal.      Palpations: Abdomen is soft.      Comments: Incisions healing well   Skin:     General: Skin is warm and dry.   Neurological:      Mental Status: He is alert.   Psychiatric:         Mood and Affect: Mood normal.         Behavior: Behavior normal.         Thought Content: Thought content normal.         Judgment: Judgment normal.           Assessment:  3 months s/p LSG 12/28/20 GDW    ICD-10-CM ICD-9-CM   1. Status post bariatric surgery  Z98.84 V45.86   2. Obesity, Class III, BMI 40-49.9 (morbid obesity) (CMS/[facility])  E66.01 278.01   3. Hypovitaminosis D  E55.9 268.9   4. Screening, iron deficiency anemia  Z13.0 V78.0   5. Malnutrition screen  Z13.21 V77.2   6. 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 Fatigue, unspecified type  R53.83 780.79         Plan:  Doing well. Continue w/ good food choices and healthy habits.  Increase overall food intake. Continue protein +g/day.  Continue fluids 64+oz daily. Continue routine exercise.cont actigall.   Routine bariatric labs ordered.  Continue MVI and iron BID w/ adjustments pending lab results.  Call w/ problems/concerns.     Patient's Body mass index is 40.75 kg/m². BMI is above normal parameters. Recommendations include: exercise counseling and nutrition counseling.        The patient was instructed to follow up in 3 months, sooner if needed.

## 2021-03-30 ENCOUNTER — IMMUNIZATION (OUTPATIENT)
Dept: VACCINE CLINIC | Facility: HOSPITAL | Age: 46
End: 2021-03-30

## 2021-03-30 PROCEDURE — 0002A: CPT | Performed by: INTERNAL MEDICINE

## 2021-03-30 PROCEDURE — 91300 HC SARSCOV02 VAC 30MCG/0.3ML IM: CPT | Performed by: INTERNAL MEDICINE

## 2021-04-02 LAB
25(OH)D3+25(OH)D2 SERPL-MCNC: 33.6 NG/ML (ref 30–100)
ALBUMIN SERPL-MCNC: 4.3 G/DL (ref 3.5–5.2)
ALBUMIN/GLOB SERPL: 1.8 G/DL
ALP SERPL-CCNC: 83 U/L (ref 39–117)
ALT SERPL-CCNC: 20 U/L (ref 1–41)
AST SERPL-CCNC: 18 U/L (ref 1–40)
BASOPHILS # BLD AUTO: 0.05 10*3/MM3 (ref 0–0.2)
BASOPHILS NFR BLD AUTO: 0.6 % (ref 0–1.5)
BILIRUB SERPL-MCNC: 0.6 MG/DL (ref 0–1.2)
BUN SERPL-MCNC: 16 MG/DL (ref 6–20)
BUN/CREAT SERPL: 19.5 (ref 7–25)
CALCIUM SERPL-MCNC: 9.2 MG/DL (ref 8.6–10.5)
CHLORIDE SERPL-SCNC: 106 MMOL/L (ref 98–107)
CO2 SERPL-SCNC: 26.6 MMOL/L (ref 22–29)
CREAT SERPL-MCNC: 0.82 MG/DL (ref 0.76–1.27)
EOSINOPHIL # BLD AUTO: 0.2 10*3/MM3 (ref 0–0.4)
EOSINOPHIL NFR BLD AUTO: 2.4 % (ref 0.3–6.2)
ERYTHROCYTE [DISTWIDTH] IN BLOOD BY AUTOMATED COUNT: 14.8 % (ref 12.3–15.4)
FERRITIN SERPL-MCNC: 41 NG/ML (ref 30–400)
FOLATE SERPL-MCNC: 9.75 NG/ML (ref 4.78–24.2)
GLOBULIN SER CALC-MCNC: 2.4 GM/DL
GLUCOSE SERPL-MCNC: 91 MG/DL (ref 65–99)
HCT VFR BLD AUTO: 43.2 % (ref 37.5–51)
HGB BLD-MCNC: 14.5 G/DL (ref 13–17.7)
IMM GRANULOCYTES # BLD AUTO: 0.02 10*3/MM3 (ref 0–0.05)
IMM GRANULOCYTES NFR BLD AUTO: 0.2 % (ref 0–0.5)
IRON SERPL-MCNC: 79 MCG/DL (ref 59–158)
LYMPHOCYTES # BLD AUTO: 2.15 10*3/MM3 (ref 0.7–3.1)
LYMPHOCYTES NFR BLD AUTO: 26.3 % (ref 19.6–45.3)
Lab: NORMAL
MCH RBC QN AUTO: 27.8 PG (ref 26.6–33)
MCHC RBC AUTO-ENTMCNC: 33.6 G/DL (ref 31.5–35.7)
MCV RBC AUTO: 82.8 FL (ref 79–97)
METHYLMALONATE SERPL-SCNC: 213 NMOL/L (ref 0–378)
MONOCYTES # BLD AUTO: 0.59 10*3/MM3 (ref 0.1–0.9)
MONOCYTES NFR BLD AUTO: 7.2 % (ref 5–12)
NEUTROPHILS # BLD AUTO: 5.17 10*3/MM3 (ref 1.7–7)
NEUTROPHILS NFR BLD AUTO: 63.3 % (ref 42.7–76)
NRBC BLD AUTO-RTO: 0 /100 WBC (ref 0–0.2)
PLATELET # BLD AUTO: 323 10*3/MM3 (ref 140–450)
POTASSIUM SERPL-SCNC: 4.5 MMOL/L (ref 3.5–5.2)
PREALB SERPL-MCNC: 20 MG/DL (ref 12–34)
PROT SERPL-MCNC: 6.7 G/DL (ref 6–8.5)
RBC # BLD AUTO: 5.22 10*6/MM3 (ref 4.14–5.8)
SODIUM SERPL-SCNC: 142 MMOL/L (ref 136–145)
VIT B1 BLD-SCNC: 140.5 NMOL/L (ref 66.5–200)
WBC # BLD AUTO: 8.18 10*3/MM3 (ref 3.4–10.8)

## 2021-06-28 ENCOUNTER — OFFICE VISIT (OUTPATIENT)
Dept: BARIATRICS/WEIGHT MGMT | Facility: CLINIC | Age: 46
End: 2021-06-28

## 2021-06-28 VITALS
OXYGEN SATURATION: 98 % | RESPIRATION RATE: 18 BRPM | BODY MASS INDEX: 39.65 KG/M2 | DIASTOLIC BLOOD PRESSURE: 62 MMHG | WEIGHT: 277 LBS | TEMPERATURE: 98.2 F | HEIGHT: 70 IN | HEART RATE: 85 BPM | SYSTOLIC BLOOD PRESSURE: 116 MMHG

## 2021-06-28 DIAGNOSIS — Z13.0 SCREENING, IRON DEFICIENCY ANEMIA: ICD-10-CM

## 2021-06-28 DIAGNOSIS — E55.9 HYPOVITAMINOSIS D: ICD-10-CM

## 2021-06-28 DIAGNOSIS — Z98.84 STATUS POST BARIATRIC SURGERY: Primary | ICD-10-CM

## 2021-06-28 DIAGNOSIS — Z13.21 MALNUTRITION SCREEN: ICD-10-CM

## 2021-06-28 DIAGNOSIS — E66.9 OBESITY, CLASS II, BMI 35-39.9: ICD-10-CM

## 2021-06-28 DIAGNOSIS — R53.83 FATIGUE, UNSPECIFIED TYPE: ICD-10-CM

## 2021-06-28 DIAGNOSIS — K91.2 POSTGASTRECTOMY MALABSORPTION: ICD-10-CM

## 2021-06-28 DIAGNOSIS — Z90.3 POSTGASTRECTOMY MALABSORPTION: ICD-10-CM

## 2021-06-28 PROCEDURE — 99213 OFFICE O/P EST LOW 20 MIN: CPT | Performed by: PHYSICIAN ASSISTANT

## 2021-06-28 NOTE — PROGRESS NOTES
"[facility] Bariatric Surgery  [address]  [address]  [facility] [id number] [phone]        Patient Name:  Jefry Sierra.  :  1975        Reason for Visit:   6 months postop      HPI: Jefry Sierra is a 46 y.o. male s/p LSG 20 GDW    Doing well.  No issues/concerns. Noting changes in clothing but not seeing scale move much. Denies dysphagia, reflux, nausea, vomiting and abdominal pain.  Getting 100+g prot/day.  Protein shake in morning. Cheese, beef jerky. Lunch- sandwich. Dinner- grilled meats + vegetables or potatoes. Drinking  ?64 fluid oz/day, \"a lot of water\"  Last labs advised start B12 1000mcg, Vit D 5000, 1 iron daily, daily MVI.  Taking MVI, B12, Vit D and iron.  On Pantoprazole, symptoms noted when stopping.   Exercising- walking 1.5-2 miles a day.     Presurgery weight: 321 pounds.  Today's weight is 126 kg (277 lb) pounds, today's  Body mass index is 39.75 kg/m²., and@ weight loss since surgery is 44 pounds.      Past Medical History:   Diagnosis Date   • Anesthesia complication     wakes w/ terrible headache   • Dyspepsia    • Dyspnea on exertion    • Fatigue    • GERD (gastroesophageal reflux disease)     controlled on Protonix, improved since stopping tobacco, EGD Dr. Glasgow 10/26/2020 small intermittent hiatal hernia and changes possibly consistent with H. pylori gastritis.   • H. pylori infection     UBT/EGD bx (+) - PCP RX Cipro + Biaxin   • Hyperlipidemia    • IBS (irritable bowel syndrome)     worse w/ dairy or chocolates, denies prior GB eval    • Joint pain     prn Ibuprofen, avoids steroids   • Morbid obesity (CMS/HCC)    • RLS (restless legs syndrome)    • Sleep apnea     suspected, never tested   • Spinal headache    • Tobacco use disorder, moderate, in early remission     snuff quit 2020     Past Surgical History:   Procedure Laterality Date   • ENDOSCOPY     • ENDOSCOPY N/A 2020    Procedure: ESOPHAGOGASTRODUODENOSCOPY;  Surgeon: " "Cas Glasgow MD;  Location:  [facility];  Service: Bariatric;  Laterality: N/A;   • GASTRIC SLEEVE LAPAROSCOPIC N/A 12/28/2020    Procedure: GASTRIC SLEEVE LAPAROSCOPIC;  Surgeon: Cas Glasgow MD;  [facility]:  [facility];  Service: Bariatric;  Laterality: N/A;   • WISDOM TOOTH EXTRACTION  2000   • WRIST SURGERY Right 2000    fx repair     Outpatient Medications Marked as Taking for the 6/28/21 encounter (Office Visit) with Bela Medel PA-C   Medication Sig Dispense Refill   • pantoprazole (PROTONIX) 40 MG EC tablet Take 40 mg by mouth every night at bedtime.         Allergies   Allergen Reactions   • Penicillins Rash     reaction as a child - uncertain if can tolerate Keflex       Social History     Socioeconomic History   • Marital status:      Spouse name: Not on file   • Number of children: Not on file   • Years of education: Not on file   • Highest education level: Not on file   Tobacco Use   • Smoking status: Never Smoker   • Smokeless tobacco: Former User     Types: Snuff   Vaping Use   • Vaping Use: Never used   Substance and Sexual Activity   • Alcohol use: Never   • Drug use: Never   • Sexual activity: Defer       /62 (BP Location: Left arm, Patient Position: Sitting, Cuff Size: Adult)   Pulse 85   Temp 98.2 °F (36.8 °C) (Temporal)   Resp 18   Ht 177.8 cm (70\")   Wt 126 kg (277 lb)   SpO2 98%   BMI 39.75 kg/m²     Physical Exam  Constitutional:       Appearance: He is well-developed.   HENT:      Head: Normocephalic and atraumatic.   Cardiovascular:      Rate and Rhythm: Normal rate and regular rhythm.   Pulmonary:      Effort: Pulmonary effort is normal.      Breath sounds: Normal breath sounds.   Abdominal:      General: Bowel sounds are normal.      Palpations: Abdomen is soft.      Comments: Incisions healing well   Skin:     General: Skin is warm and dry.   Neurological:      Mental Status: He is alert.   Psychiatric:         Behavior: Behavior normal. "           Assessment:  6 months s/p LSG 12/28/20 GDW    ICD-10-CM ICD-9-CM   1. Status post bariatric surgery  Z98.84 V45.86   2. Fatigue, unspecified type  R53.83 780.79   3. Hypovitaminosis D  E55.9 268.9   4. Screening, iron deficiency anemia  Z13.0 V78.0   5. Malnutrition screen  Z13.21 V77.2   6. 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 Obesity, Class II, BMI 35-39.9  E66.9 278.00         Plan:  Doing well. Continue w/ good food choices and healthy habits.  Continue to focus on high protein, low carb.  Keep tracking intake.  Limit potato/ breads. Continue routine exercise.  Add strength training, offered referral to . Routine bariatric labs ordered.  Continue vitamins w/ adjustments pending lab results.  Call w/ problems/concerns.     Patient's Body mass index is 39.75 kg/m². indicating that he is morbidly obese (BMI > 40 or > 35 with obesity - related health condition). Obesity-related health conditions include the following: as above. Obesity is improving with treatment. BMI is is above average; BMI management plan is completed. We discussed low calorie, low carb based diet program, portion control and increasing exercise..        The patient was instructed to follow up in 3 months, sooner if needed.

## 2021-07-03 LAB
25(OH)D3+25(OH)D2 SERPL-MCNC: 48.5 NG/ML (ref 30–100)
ALBUMIN SERPL-MCNC: 4.3 G/DL (ref 3.5–5.2)
ALBUMIN/GLOB SERPL: 2 G/DL
ALP SERPL-CCNC: 76 U/L (ref 39–117)
ALT SERPL-CCNC: 21 U/L (ref 1–41)
AST SERPL-CCNC: 16 U/L (ref 1–40)
BASOPHILS # BLD AUTO: 0.06 10*3/MM3 (ref 0–0.2)
BASOPHILS NFR BLD AUTO: 0.7 % (ref 0–1.5)
BILIRUB SERPL-MCNC: 0.5 MG/DL (ref 0–1.2)
BUN SERPL-MCNC: 13 MG/DL (ref 6–20)
BUN/CREAT SERPL: 19.4 (ref 7–25)
CALCIUM SERPL-MCNC: 9.3 MG/DL (ref 8.6–10.5)
CHLORIDE SERPL-SCNC: 107 MMOL/L (ref 98–107)
CO2 SERPL-SCNC: 25.8 MMOL/L (ref 22–29)
CREAT SERPL-MCNC: 0.67 MG/DL (ref 0.76–1.27)
EOSINOPHIL # BLD AUTO: 0.19 10*3/MM3 (ref 0–0.4)
EOSINOPHIL NFR BLD AUTO: 2.4 % (ref 0.3–6.2)
ERYTHROCYTE [DISTWIDTH] IN BLOOD BY AUTOMATED COUNT: 13.2 % (ref 12.3–15.4)
FERRITIN SERPL-MCNC: 26 NG/ML (ref 30–400)
FOLATE SERPL-MCNC: 14.3 NG/ML (ref 4.78–24.2)
GLOBULIN SER CALC-MCNC: 2.2 GM/DL
GLUCOSE SERPL-MCNC: 94 MG/DL (ref 65–99)
HCT VFR BLD AUTO: 42 % (ref 37.5–51)
HGB BLD-MCNC: 13.8 G/DL (ref 13–17.7)
IMM GRANULOCYTES # BLD AUTO: 0.03 10*3/MM3 (ref 0–0.05)
IMM GRANULOCYTES NFR BLD AUTO: 0.4 % (ref 0–0.5)
IRON SERPL-MCNC: 66 MCG/DL (ref 59–158)
LYMPHOCYTES # BLD AUTO: 2.01 10*3/MM3 (ref 0.7–3.1)
LYMPHOCYTES NFR BLD AUTO: 25 % (ref 19.6–45.3)
Lab: NORMAL
MCH RBC QN AUTO: 27.5 PG (ref 26.6–33)
MCHC RBC AUTO-ENTMCNC: 32.9 G/DL (ref 31.5–35.7)
MCV RBC AUTO: 83.7 FL (ref 79–97)
METHYLMALONATE SERPL-SCNC: 93 NMOL/L (ref 0–378)
MONOCYTES # BLD AUTO: 0.65 10*3/MM3 (ref 0.1–0.9)
MONOCYTES NFR BLD AUTO: 8.1 % (ref 5–12)
NEUTROPHILS # BLD AUTO: 5.09 10*3/MM3 (ref 1.7–7)
NEUTROPHILS NFR BLD AUTO: 63.4 % (ref 42.7–76)
NRBC BLD AUTO-RTO: 0 /100 WBC (ref 0–0.2)
PLATELET # BLD AUTO: 314 10*3/MM3 (ref 140–450)
POTASSIUM SERPL-SCNC: 4.1 MMOL/L (ref 3.5–5.2)
PREALB SERPL-MCNC: 22 MG/DL (ref 12–34)
PROT SERPL-MCNC: 6.5 G/DL (ref 6–8.5)
RBC # BLD AUTO: 5.02 10*6/MM3 (ref 4.14–5.8)
SODIUM SERPL-SCNC: 141 MMOL/L (ref 136–145)
VIT B1 BLD-SCNC: 148.7 NMOL/L (ref 66.5–200)
WBC # BLD AUTO: 8.03 10*3/MM3 (ref 3.4–10.8)

## 2021-10-01 ENCOUNTER — IMMUNIZATION (OUTPATIENT)
Dept: VACCINE CLINIC | Facility: HOSPITAL | Age: 46
End: 2021-10-01

## 2021-10-01 PROCEDURE — 0003A: CPT | Performed by: INTERNAL MEDICINE

## 2021-10-01 PROCEDURE — 91300 HC SARSCOV02 VAC 30MCG/0.3ML IM: CPT | Performed by: INTERNAL MEDICINE

## 2021-10-01 PROCEDURE — 0004A ADM SARSCOV2 30MCG/0.3ML BOOSTER: CPT | Performed by: INTERNAL MEDICINE

## 2022-11-29 PROCEDURE — 90471 IMMUNIZATION ADMIN: CPT | Performed by: NURSE PRACTITIONER

## 2022-11-29 PROCEDURE — 90686 IIV4 VACC NO PRSV 0.5 ML IM: CPT | Performed by: NURSE PRACTITIONER

## (undated) DEVICE — Device: Brand: DEFENDO AIR/WATER/SUCTION AND BIOPSY VALVE

## (undated) DEVICE — POWER SHELL: Brand: SIGNIA

## (undated) DEVICE — Device

## (undated) DEVICE — ENDOPATH 5MM ENDOSCOPIC BLUNT TIP DISSECTORS (12 POUCHES CONTAINING 3 DISSECTORS EACH): Brand: ENDOPATH

## (undated) DEVICE — UNDRPD COMFRT GLD DRYPAD 36X57IN

## (undated) DEVICE — SHT AIR TRANSFR COMFRT GLIDE LAT 40X80IN

## (undated) DEVICE — GLV SURG SENSICARE PI MIC PF SZ8.5 LF STRL

## (undated) DEVICE — MARYLAND JAW LAPAROSCOPIC SEALER/DIVIDER COATED: Brand: LIGASURE

## (undated) DEVICE — PENROSE DRAIN 18 X .5" SILICONE: Brand: MEDLINE

## (undated) DEVICE — CVR HNDL LIGHT RIGID

## (undated) DEVICE — ENDOPATH XCEL BLADELESS TROCARS WITH STABILITY SLEEVES: Brand: ENDOPATH XCEL

## (undated) DEVICE — ANTIBACTERIAL VIOLET BRAIDED (POLYGLACTIN 910), SYNTHETIC ABSORBABLE SUTURE: Brand: COATED VICRYL

## (undated) DEVICE — PK BARIATRIC 10

## (undated) DEVICE — APPL CHLORAPREP TINTED 26ML TEAL

## (undated) DEVICE — GLV SURG SENSICARE PI MIC PF SZ9 LF STRL

## (undated) DEVICE — GOWN,NON-REINFORCED,SIRUS,SET IN SLV,XXL: Brand: MEDLINE

## (undated) DEVICE — FLTR PLUMEPORT LAP W/CONN STRL

## (undated) DEVICE — INTENDED USE FOR SURGICAL MARKING ON INTACT SKIN, ALSO PROVIDES A PERMANENT METHOD OF IDENTIFYING OBJECTS IN THE OPERATING ROOM: Brand: WRITESITE® REGULAR TIP SKIN MARKER

## (undated) DEVICE — CONTN GRAD MEAS TRIANG 32OZ BLK

## (undated) DEVICE — [HIGH FLOW INSUFFLATOR,  DO NOT USE IF PACKAGE IS DAMAGED,  KEEP DRY,  KEEP AWAY FROM SUNLIGHT,  PROTECT FROM HEAT AND RADIOACTIVE SOURCES.]: Brand: PNEUMOSURE